# Patient Record
Sex: FEMALE | Race: WHITE
[De-identification: names, ages, dates, MRNs, and addresses within clinical notes are randomized per-mention and may not be internally consistent; named-entity substitution may affect disease eponyms.]

---

## 2017-02-10 ENCOUNTER — HOSPITAL ENCOUNTER (OUTPATIENT)
Dept: HOSPITAL 74 - FER | Age: 62
Setting detail: OBSERVATION
LOS: 2 days | Discharge: HOME | End: 2017-02-12
Attending: NURSE PRACTITIONER | Admitting: INTERNAL MEDICINE
Payer: COMMERCIAL

## 2017-02-10 VITALS — BODY MASS INDEX: 35.1 KG/M2

## 2017-02-10 DIAGNOSIS — L03.211: Primary | ICD-10-CM

## 2017-02-10 DIAGNOSIS — K04.7: ICD-10-CM

## 2017-02-10 DIAGNOSIS — F41.9: ICD-10-CM

## 2017-02-10 DIAGNOSIS — E83.42: ICD-10-CM

## 2017-02-10 DIAGNOSIS — I10: ICD-10-CM

## 2017-02-10 LAB
ALBUMIN SERPL-MCNC: 4.5 G/DL (ref 3.5–5)
ALP SERPL-CCNC: 76 U/L (ref 32–92)
ALT SERPL-CCNC: 15 U/L (ref 10–40)
ANION GAP SERPL CALC-SCNC: 11 MMOL/L (ref 8–16)
AST SERPL-CCNC: 14 U/L (ref 10–42)
BILIRUB SERPL-MCNC: 1 MG/DL (ref 0.2–1)
CALCIUM SERPL-MCNC: 9.5 MG/DL (ref 8.4–10.2)
CO2 SERPL-SCNC: 27 MMOL/L (ref 22–28)
CREAT SERPL-MCNC: 0.8 MG/DL (ref 0.6–1.3)
DEPRECATED RDW RBC AUTO: 13.1 % (ref 11.6–15.6)
GLUCOSE SERPL-MCNC: 97 MG/DL (ref 74–106)
MCH RBC QN AUTO: 27.9 PG (ref 25.7–33.7)
MCHC RBC AUTO-ENTMCNC: 33 G/DL (ref 32–36)
MCV RBC: 84.7 FL (ref 80–96)
PLATELET # BLD AUTO: 136 K/MM3 (ref 134–434)
PMV BLD: 8.5 FL (ref 7.5–11.1)
PROT SERPL-MCNC: 7.1 G/DL (ref 6.4–8.3)
WBC # BLD AUTO: 7.2 K/MM3 (ref 4–10)

## 2017-02-10 PROCEDURE — G0378 HOSPITAL OBSERVATION PER HR: HCPCS

## 2017-02-10 RX ADMIN — CLINDAMYCIN PHOSPHATE SCH MLS/HR: 600 INJECTION, SOLUTION INTRAVENOUS at 20:09

## 2017-02-10 RX ADMIN — SODIUM CHLORIDE SCH MLS/HR: 9 INJECTION, SOLUTION INTRAVENOUS at 19:25

## 2017-02-10 NOTE — PDOC
History of Present Illness





- General


Chief Complaint: Toothache


Stated Complaint: INFECTED LEFT UPPER TOOTH #14


Time Seen by Provider: 02/10/17 13:36





- History of Present Illness


Initial Comments: 





02/10/17 13:52





61-year-old female with a past medical history of hypertension and anxiety 

patient complained of left facial swelling, and left upper molar dental pain 

since Wednesday


She noted some facial redness today


She denies any fevers or chills


She went to the dentist office, who emergently pulled her left upper molar, and 

then told her to come to the emergency department immediately from the dental 

office for IV antibiotics


The patient denies any swollen lymph nodes, or stiff neck


She denies any cough or sputum


She denies any other complaints at this time, and the remainder of the review 

of systems is negative





Past History





- Past Medical History


Allergies/Adverse Reactions: 


 Allergies











Allergy/AdvReac Type Severity Reaction Status Date / Time


 


No Known Allergies Allergy   Verified 02/10/17 13:23











Home Medications: 


Ambulatory Orders





Lisinopril [Prinivil -] 10 mg PO DAILY 03/06/15 


Quetiapine Fumarate [Seroquel -] 200 mg PO HS 03/06/15 








Anemia: No


Asthma: No


Cancer: Yes (LEFT ABDOMEN-S/P CHEMO & SURGERY)


Cardiac Disorders: No


CVA: No


COPD: No


CHF: No


Dementia: No


Diabetes: No


GI Disorders: Yes (GASTRIC ULCER-1995)


 Disorders: No


HTN: Yes (DX 2011)


Hypercholesterolemia: Yes (DX 2011)


Liver Disease: No


Psychiatric Problems: Yes (ANXIETY/DEPRESSION/ AUD HULLICNATIONS)


Seizures: No


Thyroid Disease: No





- Surgical History


Abdominal Surgery: Yes (ABDOMINAL MASS REMOVED-2012)


Appendectomy: No


Cardiac Surgery: No


Cholecystectomy: Yes (1995-CELESTE COLEMANEY)


Lung Surgery: No


Neurologic Surgery: No


Orthopedic Surgery: No





- Psycho/Social/Smoking Cessation Hx


Anxiety: No


Suicidal Ideation: No


Smoking History: Former smoker


Have you smoked in the past 12 months: No


Number of Cigarettes Smoked Daily: 25


If you are a former smoker, when did you quit?: 26 YRS AGO


Information on smoking cessation initiated: No


Hx Alcohol Use: No


Drug/Substance Use Hx: No


Substance Use Type: None


Hx Substance Use Treatment: No





*Physical Exam





- Vital Signs


 Last Vital Signs











Temp Pulse Resp BP Pulse Ox


 


 99.0 F   102 H  18   188/119   98 


 


 02/10/17 13:22  02/10/17 13:22  02/10/17 13:22  02/10/17 13:22  02/10/17 13:22














- Physical Exam


Comments: 


02/10/17 13:53


Physical exam





 Last Vital Signs











Temp Pulse Resp BP Pulse Ox


 


 99.0 F   102 H  18   188/119   98 


 


 02/10/17 13:22  02/10/17 13:22  02/10/17 13:22  02/10/17 13:22  02/10/17 13:22














Patient is alert and ambulatory


Head is normocephalic and atraumatic


There is left sided facial swelling, with an area of facial cellulitis


There is no area of fluctuance felt


Patient is able to open and close her mouth,


The dental site where the left upper molar was pulled appears clean, but there 

is surrounding gum swelling


There is no purulent drainage from the site


There is no submental swelling or tenderness


There is no Jorge L's angina


There is no swelling or tenderness in the angle of the jaw


There is no meningismus


There is no cervical adenopathy


Lungs are clear


Heart is regular











ED Treatment Course





- LABORATORY


CBC & Chemistry Diagram: 


 02/11/17 07:30





 02/10/17 13:45





Medical Decision Making





- Medical Decision Making





02/10/17 13:55








Dental infection, with some evidence of facial cellulitis


Will start with IV Toradol and clindamycin


We'll check labwork and blood cultures





SIGN OUT 


Case discussed in detail with oncoming Emergency Physician including history, 

physical exam and ancillary studies. 


Oncoming Emergency Physician has assumed care for the patient and will complete 

the evaluation and treatment. 


trnsfer of care to Dr. Blevins at 2 PM





*DC/Admit/Observation/Transfer


Diagnosis at time of Disposition: 


 Dental infection, Facial cellulitis





- Discharge Dispostion


Condition at time of disposition: Stable

## 2017-02-10 NOTE — HP
CHIEF COMPLAINT: Left facial swelling 





PCP: Dr. Benson 


Oncologist: Dr. Ha [phone]





HISTORY OF PRESENT ILLNESS:


This is a 61 year old female with pmhx of HTN, anxiety, ? lymphoma with abd 

tumor removal s/p chemotherapy, former cigarette smoker (for 30 years, quit 9 

years ago) with a past medical history of abdominal tumor removal s/p 

chemotherapy for 2 years, ~ 4 year ago at [facility] presented to the ED 

after being sent in from her dentist for left facial swelling and redness. 


Today, she had a left tooth (#14) extracted due to an abscess. She currently 

states her pain is under control, she has no difficultly breathing or swallowing

, denies fever, chills, CP, abd pain, nausea, vomiting. She is still bleeding 

from her tooth extraction site, changing oral gauze pads, however bleeding is 

not excessive. Son is at bedside, says she was seen in June at [facility] for her 

lymphoma check up and everything was fine, they were not sure exactly what was 

removed in her stomach and what lymphoma she had. 





ER course was notable for:


(1) Left sided facial swelling and erythema, no abscess noted, no fluctuance 


(2) IVF started 


(3) Clindamycin 600mg x1 





Recent Travel:  





PAST MEDICAL HISTORY: 


HTN, anxiety, ? lymphoma, abdominal tumor 





PAST SURGICAL HISTORY:


R flank tumor removed 





Social History: Lives in [place] 


Smoking: none


Alcohol: none 


Drugs: none 





Family History: NA


Allergies


No Known Allergies Allergy (Verified 02/10/17 13:23)


 








HOME MEDICATIONS:


 Home Medications











 Medication  Instructions  Recorded


 


Furosemide [Lasix -] 20 mg PO DAILY 03/06/15


 


Lisinopril [Prinivil -] 10 mg PO DAILY 03/06/15


 


Quetiapine Fumarate [Seroquel -] 200 mg PO HS 03/06/15








REVIEW OF SYSTEMS


CONSTITUTIONAL: 


Absent:  fever, chills, diaphoresis, generalized weakness, malaise, loss of 

appetite, weight change


HEENT: 


Tooth abscess Absent:  rhinorrhea, nasal congestion, throat pain, throat 

swelling, difficulty swallowing, mouth swelling, ear pain, eye pain, visual 

changes


CARDIOVASCULAR: 


Absent: chest pain, syncope, palpitations, irregular heart rate, lightheadedness

, peripheral edema


RESPIRATORY: 


Absent: cough, shortness of breath, dyspnea with exertion, orthopnea, wheezing, 

stridor, hemoptysis


GASTROINTESTINAL:


Absent: abdominal pain, abdominal distension, nausea, vomiting, diarrhea, 

constipation, melena, hematochezia


GENITOURINARY: 


Absent: dysuria, frequency, urgency, hesitancy, hematuria, flank pain, genital 

pain


MUSCULOSKELETAL: 


Absent: myalgia, arthralgia, joint swelling, back pain, neck pain


SKIN: 


Left cheek swelling, redness Absent: rash, itching, pallor


HEMATOLOGIC/IMMUNOLOGIC: 


Absent: easy bleeding, easy bruising, lymphadenopathy, frequent infections


ENDOCRINE:


Absent: unexplained weight gain, unexplained weight loss, heat intolerance, 

cold intolerance


NEUROLOGIC: 


Absent: headache, focal weakness or paresthesias, dizziness, unsteady gait, 

seizure, mental status changes, bladder or bowel incontinence


PSYCHIATRIC: 


Absent: anxiety, depression, suicidal or homicidal ideation, hallucinations.








 Vital Signs - 24 hr











  02/10/17





  13:22


 


Temperature 99.0 F


 


Pulse Rate 102 H


 


Respiratory 18





Rate 


 


Blood Pressure 188/119


 


O2 Sat by Pulse 98





Oximetry (%) 











PHYSICAL EXAMINATION


GENERAL: Awake, alert, and fully oriented, in no acute distress.


HEAD: Left facial swelling infraorbital to cheek, no abscess, no fluctuance, + 

erythema, no cervical lymph involvement  Normal with no signs of trauma.


EYES: Pupils equal, round and reactive to light, extraocular movements intact, 

sclera anicteric, conjunctiva clear. No lid lag.


EARS, NOSE, THROAT: L tooth bleeding Ears normal, nares patent, oropharynx 

clear without exudates. Moist mucous membranes.


NECK: Normal range of motion, supple without lymphadenopathy, JVD, or masses.


LUNGS: Breath sounds equal, clear to auscultation bilaterally. No wheezes, and 

no crackles. No accessory muscle use.


HEART: Regular rate and rhythm, normal S1 and S2 without murmur, rub or gallop.


ABDOMEN: Soft, nontender, not distended, normoactive bowel sounds, no guarding, 

no rebound, no masses.  No hepatomegaly or  splenomegaly. 


MUSCULOSKELETAL: Normal range of motion at all joints. No bony deformities or 

tenderness. No CVA tenderness.


UPPER EXTREMITIES: 2+ pulses, warm, well-perfused. No cyanosis. No clubbing. 

Cap refill <2 seconds. No peripheral edema.


LOWER EXTREMITIES: 2+ pulses, warm, well-perfused. No calf tenderness. No 

peripheral edema. 


NEUROLOGICAL:  Cranial nerves II-XII intact. Normal speech. Normal gait.


PSYCHIATRIC: Cooperative. Good eye contact. Appropriate mood and affect.


SKIN: Warm, dry, normal turgor, no rashes or lesions noted. 





 Laboratory Results - last 24 hr











  02/10/17 02/10/17





  13:45 13:45


 


WBC  7.2 


 


RBC  5.22 H 


 


Hgb  14.6 


 


Hct  44.2 


 


MCV  84.7 


 


MCHC  33.0 


 


RDW  13.1 


 


Plt Count  136 


 


MPV  8.5 


 


Sodium   136


 


Potassium   3.8


 


Chloride   98


 


Carbon Dioxide   27


 


Anion Gap   11


 


BUN   14


 


Creatinine   0.8


 


Creat Clearance w eGFR   > 60


 


Random Glucose   97


 


Calcium   9.5


 


Total Bilirubin   1.0


 


AST   14


 


ALT   15


 


Alkaline Phosphatase   76


 


Total Protein   7.1


 


Albumin   4.5











Assessment: 61 year old female with HTN admitted for facial cellulitis 

following left tooth extraction for tooth abscess





Plan:





1. Left facial cellulitis s/p left tooth abscess with extraction 


- Continue clindamycin 


- Continue IVF 


- If worsens will obtain imaging and plastics consult 





2. HTN 


- Continue Lisinopril 


- Continue lasix 





3. Anxiety 


- Continue seroquel 





4. Left tooth extraction 


- Not on any abx per Dentist, will need to go home on augmentin or clinda 

pending resolution of facial cellulitis 





5. DVT ppx 


- SCDs, expect short stay, hold chemical AC while active bleeding from tooth 





Problem List





- Problem


(1) Dental infection


Code(s): K04.7 - PERIAPICAL ABSCESS WITHOUT SINUS





(2) Facial cellulitis


Code(s): L03.211 - CELLULITIS OF FACE





(3) HTN (hypertension)


Code(s): I10 - ESSENTIAL (PRIMARY) HYPERTENSION   





(4) Anxiety


Code(s): F41.9 - ANXIETY DISORDER, UNSPECIFIED








Visit type





- Emergency Visit


Emergency Visit: Yes


Care time: The patient presented to the Emergency Department on the above date 

and was hospitalized for further evaluation of their emergent condition.





- New Patient


This patient is new to me today: Yes


Date on this admission: 02/10/17





- Critical Care


Critical Care patient: No

## 2017-02-10 NOTE — PDOC
ED Treatment Course





- LABORATORY


CBC & Chemistry Diagram: 


 02/10/17 13:45





 02/10/17 13:45





- ADDITIONAL ORDERS


Additional order review: 


 Laboratory  Results











  02/10/17





  13:45


 


Sodium  136


 


Potassium  3.8


 


Chloride  98


 


Carbon Dioxide  27


 


Anion Gap  11


 


BUN  14


 


Creatinine  0.8


 


Creat Clearance w eGFR  > 60


 


Random Glucose  97


 


Calcium  9.5


 


Total Bilirubin  1.0


 


AST  14


 


ALT  15


 


Alkaline Phosphatase  76


 


Total Protein  7.1


 


Albumin  4.5








 











  02/10/17





  13:45


 


RBC  5.22 H


 


MCV  84.7


 


MCHC  33.0


 


RDW  13.1


 


MPV  8.5














- Medications


Given in the ED: 


ED Medications














Discontinued Medications














Generic Name Dose Route Start Last Admin





  Trade Name Freq  PRN Reason Stop Dose Admin


 


Clindamycin Phosphate  50 mls @ 100 mls/hr 02/10/17 13:51 02/10/17 14:00





  Cleocin 600 Mg Premix Ivpb -  IVPB 02/10/17 14:20  100 mls/hr





  ONCE ONE   Administration


 


Ketorolac Tromethamine  30 mg 02/10/17 13:51 02/10/17 13:54





  Toradol Injection -  IVPUSH 02/10/17 13:52  30 mg





  ONCE ONE   Administration














*DC/Admit/Observation/Transfer


Diagnosis at time of Disposition: 


 Dental infection, Facial cellulitis





- Discharge Dispostion


Condition at time of disposition: Stable


Admit: Yes


Decision to Admit order Date/Time: 


Decision to Admit Order











 Category Date Time Status


 


 Decision to Admit to Hospital Routine Admission  02/10/17 15:21 Active














- Referrals


Referrals: 


Julian Davey [Primary Care Provider] - 





- Patient Instructions





- Post Discharge Activity

## 2017-02-10 NOTE — PDOC
*Physical Exam





- Vital Signs


 Last Vital Signs











Temp Pulse Resp BP Pulse Ox


 


 99.0 F   102 H  18   188/119   98 


 


 02/10/17 13:22  02/10/17 13:22  02/10/17 13:22  02/10/17 13:22  02/10/17 13:22














ED Treatment Course





- LABORATORY


CBC & Chemistry Diagram: 


 02/10/17 13:45





 02/10/17 13:45





- ADDITIONAL ORDERS


Additional order review: 


 Laboratory  Results











  02/10/17





  13:45


 


Sodium  136


 


Potassium  3.8


 


Chloride  98


 


Carbon Dioxide  27


 


Anion Gap  11


 


BUN  14


 


Creatinine  0.8


 


Creat Clearance w eGFR  > 60


 


Random Glucose  97


 


Calcium  9.5


 


Total Bilirubin  1.0


 


AST  14


 


ALT  15


 


Alkaline Phosphatase  76


 


Total Protein  7.1


 


Albumin  4.5








 











  02/10/17





  13:45


 


RBC  5.22 H


 


MCV  84.7


 


MCHC  33.0


 


RDW  13.1


 


MPV  8.5














- Medications


Given in the ED: 


ED Medications














Discontinued Medications














Generic Name Dose Route Start Last Admin





  Trade Name Freq  PRN Reason Stop Dose Admin


 


Clindamycin Phosphate  50 mls @ 100 mls/hr 02/10/17 13:51 02/10/17 14:00





  Cleocin 600 Mg Premix Ivpb -  IVPB 02/10/17 14:20  100 mls/hr





  ONCE ONE   Administration


 


Ketorolac Tromethamine  30 mg 02/10/17 13:51 02/10/17 13:54





  Toradol Injection -  IVPUSH 02/10/17 13:52  30 mg





  ONCE ONE   Administration














Medical Decision Making





- Medical Decision Making





02/10/17 15:16


Patient is a 7-year-old woman with history of hypertension and anxiety.  She 

presents with left facial redness and swelling from the maxilla down to the jaw 

line.  She saw the dentist today and had an extraction of an abscessed tooth, #

14.  She was sent to the ED by the dentist for a course of IV antibiotics.  

Clinically, she states her pain is better post extraction.  The left face is 

markedly swollen but there is no fluctuance or abscess.  No involvement of the 

floor of the mouth or the neck.





Impression: Abscess to status post extraction


Left facial cellulitis secondary to tooth infection, spreading from the site of 

the initial infection


No abscess, nontoxic





Plan: Admit to observation for IV antibiotics


Dr. Sim to aware





*DC/Admit/Observation/Transfer


Diagnosis at time of Disposition: 


 Infection of tooth, Cellulitis of face





- Discharge Dispostion


Disposition: HOME


Condition at time of disposition: Good


Admit: No


Decision to Admit order Date/Time: 





02/10/17 15:18


Patient endorsed to Dr. Surinder Sim for admission.

## 2017-02-11 LAB
BASOPHILS # BLD: 0.5 % (ref 0–2)
DEPRECATED RDW RBC AUTO: 12.6 % (ref 11.6–15.6)
EOSINOPHIL # BLD: 1.9 % (ref 0–4.5)
MCH RBC QN AUTO: 28.1 PG (ref 25.7–33.7)
MCHC RBC AUTO-ENTMCNC: 33 G/DL (ref 32–36)
MCV RBC: 85.2 FL (ref 80–96)
NEUTROPHILS # BLD: 66.1 % (ref 42.8–82.8)
PLATELET # BLD AUTO: 96 K/MM3 (ref 134–434)
PMV BLD: 8.2 FL (ref 7.5–11.1)
WBC # BLD AUTO: 3.9 K/MM3 (ref 4–10)

## 2017-02-11 RX ADMIN — CLINDAMYCIN PHOSPHATE SCH MLS/HR: 600 INJECTION, SOLUTION INTRAVENOUS at 03:17

## 2017-02-11 RX ADMIN — CLINDAMYCIN PHOSPHATE SCH MLS/HR: 600 INJECTION, SOLUTION INTRAVENOUS at 21:30

## 2017-02-11 RX ADMIN — LISINOPRIL SCH MG: 10 TABLET ORAL at 10:09

## 2017-02-11 RX ADMIN — SODIUM CHLORIDE SCH MLS/HR: 9 INJECTION, SOLUTION INTRAVENOUS at 19:31

## 2017-02-11 RX ADMIN — CLINDAMYCIN PHOSPHATE SCH MLS/HR: 600 INJECTION, SOLUTION INTRAVENOUS at 15:19

## 2017-02-11 RX ADMIN — FUROSEMIDE SCH MG: 20 TABLET ORAL at 10:09

## 2017-02-11 RX ADMIN — CEFTRIAXONE SODIUM SCH MLS/HR: 2 INJECTION, POWDER, FOR SOLUTION INTRAMUSCULAR; INTRAVENOUS at 10:09

## 2017-02-11 RX ADMIN — FUROSEMIDE SCH: 20 TABLET ORAL at 19:31

## 2017-02-11 RX ADMIN — CLINDAMYCIN PHOSPHATE SCH MLS/HR: 600 INJECTION, SOLUTION INTRAVENOUS at 08:23

## 2017-02-11 NOTE — PN
Progress Note (short form)





- Note


Progress Note: 


ID Consult dictated


L facial cellulitis


S/P dental extraction





Empiric clindamycin/ ceftriaxone x 24h


D/C home 24h on clindamycin 300mg po tid x 7d

## 2017-02-11 NOTE — CONS
DATE OF CONSULTATION:

 

DATE OF DICTATION:  02/11/2017

 

INFECTIOUS DISEASE CONSULTATION

 

HISTORY OF PRESENT ILLNESS:  A 61-year-old female evaluated for facial cellulitis. 

The patient had a dental abscess.  She underwent extraction of a left upper molar on

the day of admission.  She subsequently developed facial erythema and swelling.  She

was referred by the dentist to the emergency room for IV antibiotic therapy.

 

The patient was empirically treated with clindamycin.  She reports some improvement

in the left facial swelling and erythema.  She has some complaints of dental pain. 

She denies any associated fever or chills.

 

PAST MEDICAL HISTORY:  Positive for hypertension, peptic ulcer disease,

hyperlipidemia, lymphoma status post chemotherapy.

 

PAST SURGICAL HISTORY:  Status post laparoscopic cholecystectomy.

 

ALLERGIES:  No known allergies.

 

MEDICATIONS:  Lasix, Prinivil, Seroquel.

 

SOCIAL HISTORY:  Former smoker.

 

LABORATORY DATA:  White count 3.9, hematocrit 36.4, platelets 96, creatinine 0.8. 

Blood cultures pending.

 

PHYSICAL EXAMINATION:

General:  She is awake and alert.  She is not acutely toxic appearing.

Vital Signs:  Temperature 98.9, blood pressure 121/61, pulse 89 and regular,

respirations 19 per minute.

HEENT:  Sclerae anicteric.  On examination of the face, there is erythema and

swelling involving the left mandibular area.  It is warm to touch and slightly

tender.  No crepitus or fluctuance.  Positive submandibular adenopathy.

Neck:  Supple.

Heart:  S1, S2 heard.

Lungs:  Clear.  No stridor or wheezing.

Abdomen:  Soft.  No tenderness elicited.

Extremities:  Negative for edema.

 

IMPRESSION:

1.  Left facial cellulitis.

2.  Status post extraction of infected left upper molar.

 

PLAN:  Await blood culture results.  Continue empiric coverage of oral ron with

clindamycin and ceftriaxone.  Substitute oral antibiotic therapy with clindamycin 300

mg t.i.d.  Next 24 hours with outpatient dental followup.

 

Thank you for the kind referral.

 

 

NATALIE BENTON M.D.

 

[id number]

DD: 02/11/2017 10:23

DT: 02/11/2017 12:58

Job #:  [id number]

## 2017-02-11 NOTE — PN
Physical Exam: 


SUBJECTIVE: Patient seen and examined, denies CP/SOB, denies fever or chills.








OBJECTIVE: 61 year old female with HTN admitted for facial cellulitis following 

left tooth extraction for tooth abscess





 Last Vital Signs











Temp Pulse Resp BP Pulse Ox


 


 98.9 F   81   16   116/63   94 L


 


 02/11/17 14:31  02/11/17 14:31  02/11/17 14:31  02/11/17 14:31  02/11/17 14:31











GENERAL: Awake, alert, and fully oriented, in no acute distress.


HEAD: Left facial swelling infraorbital to cheek, no abscess, no induration, + 

erythema, no cervical lymph involvement  Normal with no signs of trauma.


EYES: Pupils equal, round and reactive to light, extraocular movements intact, 

sclera anicteric, conjunctiva clear. No lid lag.


EARS, NOSE, THROAT: Left tooth bleeding Ears normal, nares patent, oropharynx 

clear without exudates. Moist mucous membranes.


NECK: Normal range of motion, supple without lymphadenopathy, JVD, or masses.


LUNGS: Breath sounds equal, clear to auscultation bilaterally. No wheezes, and 

no crackles. No accessory muscle use.


HEART: Regular rate and rhythm, normal S1 and S2 without murmur, rub or gallop.


ABDOMEN: Soft, nontender, not distended, normoactive bowel sounds, no guarding, 

no rebound, no masses.  No hepatomegaly or  splenomegaly. 


MUSCULOSKELETAL: Normal range of motion at all joints. No bony deformities or 

tenderness. No CVA tenderness.


UPPER EXTREMITIES: 2+ pulses, warm, well-perfused. No cyanosis. No clubbing. 

Cap refill <2 seconds. No peripheral edema.


LOWER EXTREMITIES: 2+ pulses, warm, well-perfused. No calf tenderness. No 

peripheral edema. 


NEUROLOGICAL:  Cranial nerves II-XII intact. Normal speech. Normal gait.


PSYCHIATRIC: Cooperative. Good eye contact. Appropriate mood and affect.


SKIN: Warm, dry, normal turgor, no rashes or lesions noted. 














 Laboratory Results - last 24 hr











  02/11/17 02/11/17





  07:30 07:30


 


WBC  3.9 L D 


 


RBC  4.27 


 


Hgb  12.0  D 


 


Hct  36.4  D 


 


MCV  85.2 


 


MCHC  33.0 


 


RDW  12.6 


 


Plt Count  96 L D 


 


MPV  8.2 


 


Neutrophils %  66.1 


 


Lymphocytes %  20.0 


 


Monocytes %  11.5 H 


 


Eosinophils %  1.9 


 


Basophils %  0.5 


 


Magnesium   1.7 L





 


Active Medications











Generic Name Dose Route Start Last Admin





  Trade Name Freq  PRN Reason Stop Dose Admin


 


Acetaminophen  650 mg 02/10/17 17:40 02/11/17 08:23





  Tylenol -  PO   650 mg





  Q4H PRN   Administration





  FEVER OR PAIN   


 


Furosemide  20 mg 02/11/17 10:00 02/11/17 10:09





  Lasix -  PO   20 mg





  DAILY MICHELLE   Administration


 


Clindamycin Phosphate  50 mls @ 100 mls/hr 02/10/17 21:00 02/11/17 15:19





  Cleocin 600 Mg Premix Ivpb -  IVPB   100 mls/hr





  Q6H-IV MICHELLE   Administration


 


Sodium Chloride  1,000 mls @ 83 mls/hr 02/10/17 17:45 02/10/17 19:25





  Normal Saline -  IV   83 mls/hr





  ASDIR MICHELLE   Administration


 


Ceftriaxone Sodium 2 gm/  100 mls @ 200 mls/hr 02/11/17 10:00 02/11/17 10:09





  Dextrose  IVPB   200 mls/hr





  DAILY MICHELLE   Administration


 


Lisinopril  10 mg 02/11/17 10:00 02/11/17 10:09





  Prinivil  PO   10 mg





  DAILY MICHELLE   Administration


 


Oxycodone HCl  5 mg 02/10/17 17:40 02/11/17 00:44





  Roxicodone -  PO   5 mg





  Q4H PRN   Administration





  PAIN   


 


Quetiapine Fumarate  200 mg 02/10/17 22:00 02/10/17 23:10





  Seroquel -  PO   200 mg





  HS MICHELLE   Administration








 Microbiology











 02/10/17 13:50 Blood Culture - Preliminary





 Blood - Peripheral Venous    NO GROWTH OBTAINED AFTER 24 HOURS, INCUBATION TO 

CONTINUE





    FOR 4 DAYS.


 


 02/10/17 13:50 Blood Culture - Preliminary





 Blood - Peripheral Venous    NO GROWTH OBTAINED AFTER 24 HOURS, INCUBATION TO 

CONTINUE





    FOR 4 DAYS.














ASSESSMENT/PLAN:


1. Left facial cellulitis s/p left tooth abscess with extraction 


- S/p ID eval with recommendations for: rocephin/clinda x24 hours then 

clindamycin oral 300 mg po TID x7 days at D/c


  -Continue IVF 


- oral surgery f/u at D/c


prn analgesia


prn antipyretic, blood cultures no growth x24 hrs.





2. HTN: controlled 


- Continue Lisinopril 


- Continue lasix 


monitor BP, adjust meds prn





3. Anxiety 


- Continue seroquel 





4. Left tooth extraction 


-Per ID, D/c home on clinda( see ID recommendations) 





5- hypomagnesemia: replenish, repeat level in AM











 DVT ppx 


- SCDs,  hold chemical AC while active bleeding from tooth 





Dispo: still requires inpt care.





Visit type





- Emergency Visit


Emergency Visit: Yes


ED Registration Date: 02/10/17


Care time: The patient presented to the Emergency Department on the above date 

and was hospitalized for further evaluation of their emergent condition.





- New Patient


This patient is new to me today: Yes


Date on this admission: 02/11/17





- Critical Care


Critical Care patient: No





- Discharge Referral


Referred to [facility] Yes

## 2017-02-12 VITALS — TEMPERATURE: 98.7 F | SYSTOLIC BLOOD PRESSURE: 116 MMHG | DIASTOLIC BLOOD PRESSURE: 55 MMHG | HEART RATE: 75 BPM

## 2017-02-12 LAB
ANION GAP SERPL CALC-SCNC: 6 MMOL/L (ref 8–16)
BASOPHILS # BLD: 0.8 % (ref 0–2)
CALCIUM SERPL-MCNC: 8.2 MG/DL (ref 8.5–10.1)
CO2 SERPL-SCNC: 29 MMOL/L (ref 21–32)
CREAT SERPL-MCNC: 0.7 MG/DL (ref 0.55–1.02)
DEPRECATED RDW RBC AUTO: 13.8 % (ref 11.6–15.6)
EOSINOPHIL # BLD: 4.1 % (ref 0–4.5)
GLUCOSE SERPL-MCNC: 82 MG/DL (ref 74–106)
MCH RBC QN AUTO: 29.1 PG (ref 25.7–33.7)
MCHC RBC AUTO-ENTMCNC: 33.8 G/DL (ref 32–36)
MCV RBC: 86.1 FL (ref 80–96)
NEUTROPHILS # BLD: 54.7 % (ref 42.8–82.8)
PLATELET # BLD AUTO: 97 K/MM3 (ref 134–434)
PMV BLD: 8.9 FL (ref 7.5–11.1)
WBC # BLD AUTO: 3.3 K/MM3 (ref 4–10)

## 2017-02-12 RX ADMIN — CEFTRIAXONE SODIUM SCH: 2 INJECTION, POWDER, FOR SOLUTION INTRAMUSCULAR; INTRAVENOUS at 11:29

## 2017-02-12 RX ADMIN — FUROSEMIDE SCH MG: 20 TABLET ORAL at 10:05

## 2017-02-12 RX ADMIN — LISINOPRIL SCH MG: 10 TABLET ORAL at 10:05

## 2017-02-12 RX ADMIN — CLINDAMYCIN PHOSPHATE SCH MLS/HR: 600 INJECTION, SOLUTION INTRAVENOUS at 02:54

## 2017-02-12 RX ADMIN — CLINDAMYCIN PHOSPHATE SCH: 600 INJECTION, SOLUTION INTRAVENOUS at 11:28

## 2017-02-12 NOTE — DS
Physical Exam: 


SUBJECTIVE: Patient seen and examined. Feels better. Swallowing without 

difficulty. 








OBJECTIVE:





 Vital Signs











 Period  Temp  Pulse  Resp  BP Sys/Smiley  Pulse Ox


 


 Last 24 Hr  98.7 F-99.7 F  75-89  16-20  112-121/55-63  94-95








PHYSICAL EXAM





GENERAL: The patient is awake, alert, and fully oriented, in no acute distress.


HEAD: Normal with no signs of trauma.


EYES: PERRL, extraocular movements intact, sclera anicteric, conjunctiva clear. 


ENT: Ears normal, nares patent, oropharynx clear without exudates, moist mucous 

membranes.


NECK: Trachea midline, full range of motion, supple. 


LUNGS: Breath sounds equal, clear to auscultation bilaterally, no wheezes, no 

crackles, no accessory muscle use. 


HEART: Regular rate and rhythm, S1, S2 without murmur, rub or gallop.


ABDOMEN: Soft, nontender, nondistended, normoactive bowel sounds, no guarding, 

no rebound


EXTREMITIES: 2+ pulses, warm, well-perfused, no edema. 


NEUROLOGICAL: Cranial nerves II through XII grossly intact. Normal speech, 

steady gait observed.








 Laboratory Results - last 24 hr











  02/12/17 02/12/17





  06:30 06:30


 


WBC  3.3 L 


 


RBC  4.32 


 


Hgb  12.6 


 


Hct  37.2 


 


MCV  86.1 


 


MCHC  33.8 


 


RDW  13.8 


 


Plt Count  97 L 


 


MPV  8.9 


 


Neutrophils %  54.7 


 


Lymphocytes %  27.5 


 


Monocytes %  12.9 H 


 


Eosinophils %  4.1 


 


Basophils %  0.8 


 


Sodium   140


 


Potassium   4.3


 


Chloride   105


 


Carbon Dioxide   29


 


Anion Gap   6 L


 


BUN   15


 


Creatinine   0.7


 


Random Glucose   82


 


Calcium   8.2 L











HOSPITAL COURSE:





Date of Admission:02/10/17





Date of Discharge: 02/12/17





Patient is a 61 year-old woman with history of hypertension and anxiety.  She 

presented to ED with left facial redness and swelling from the maxilla down to 

the jaw line.  She saw the dentist earlier in the day and had an extraction of 

an abscessed tooth, #14.  She was sent to the ED by the dentist for a course of 

IV antibiotics.  Clinically, she stated her pain was better post extraction.  

The left face was markedly swollen but there is no fluctuance or abscess. No 

involvement of the floor of the mouth or the neck.





Impression: Abscess to status post extraction


Left facial cellulitis secondary to tooth infection, spreading from the site of 

the initial infection


No abscess, nontoxic





Admitted to observation for IV antibiotics





Started on clindamycin IV and ceftriaxone for 24 hours. Discharged to home with 

a prescription for clindamycin PO 300mg TID x 7 days. Follow up with dentist in 

one week.





Minutes to complete discharge: 35





Discharge Summary


Reason For Visit: FACIAL CELLULITIS


Current Active Problems





Anxiety (Acute) 


Dental infection (Acute) 


Facial cellulitis (Acute) 


HTN (hypertension) (Acute) 








Condition: Improved





- Instructions


Diet, Activity, Other Instructions: 


A prescription for clindamycin has been sent to your pharmacy. This is an 

antibiotic. Take this medication as directed and be sure to FINISH all the 

medication.





You should follow up with your dentist in one week. 





Return to the emergency department for any new or worsening symptoms.


Referrals: 


Julian Davey [Primary Care Provider] - 


Disposition: HOME





- Home Medications


Comprehensive Discharge Medication List: 


Ambulatory Orders





Lisinopril [Prinivil] 10 mg PO DAILY 03/06/15 


Quetiapine Fumarate [Seroquel -] 200 mg PO HS 03/06/15 








This patient is new to me today: Yes


Date on this admission: 02/12/17


Emergency Visit: Yes


ED Registration Date: 02/10/17


Care time: The patient presented to the Emergency Department on the above date 

and was hospitalized for further evaluation of their emergent condition.


Critical Care patient: No





- Discharge Referral


Referred to [facility] No

## 2017-07-19 ENCOUNTER — HOSPITAL ENCOUNTER (OUTPATIENT)
Dept: HOSPITAL 74 - FASU-ENDO | Age: 62
Discharge: HOME | End: 2017-07-19
Attending: INTERNAL MEDICINE
Payer: COMMERCIAL

## 2017-07-19 VITALS — HEART RATE: 61 BPM | DIASTOLIC BLOOD PRESSURE: 59 MMHG | SYSTOLIC BLOOD PRESSURE: 127 MMHG

## 2017-07-19 VITALS — TEMPERATURE: 98 F

## 2017-07-19 VITALS — BODY MASS INDEX: 37.1 KG/M2

## 2017-07-19 DIAGNOSIS — R10.13: ICD-10-CM

## 2017-07-19 DIAGNOSIS — K29.50: Primary | ICD-10-CM

## 2017-07-19 PROCEDURE — 0DB68ZX EXCISION OF STOMACH, VIA NATURAL OR ARTIFICIAL OPENING ENDOSCOPIC, DIAGNOSTIC: ICD-10-PCS | Performed by: INTERNAL MEDICINE

## 2017-07-19 PROCEDURE — 0DB98ZX EXCISION OF DUODENUM, VIA NATURAL OR ARTIFICIAL OPENING ENDOSCOPIC, DIAGNOSTIC: ICD-10-PCS | Performed by: INTERNAL MEDICINE

## 2017-07-20 NOTE — PATH
Surgical Pathology Report



Patient Name:  SALO WHALEN

Accession #:  [id number]

[facility]. Rec. #:  [id number]                                                        

   /Age/Gender:  1955 (Age: 62) / F

Account:  [id number]                                                          

             Location: [facility]-ENDOSCOPY

Taken:  2017

Received:  2017

Reported:  2017

Physicians:  Roman Shaw M.D.

  



Specimen(s) Received

A: BX ANTRUM 

B: BX DUODENUM 





Clinical History

GERD

Rule out celiac disease, gastritis







Final Diagnosis

A. ANTRUM, BIOPSY:  

MILD CHRONIC GASTRITIS.

IMMUNOSTAIN IS NEGATIVE FOR H. PYLORI ORGANISMS.



B. DUODENUM, BIOPSY:  

DUODENAL MUCOSA WITH NO PATHOLOGIC FINDINGS.



Note: Features suggestive of celiac disease are not identified in this biopsy.





***Electronically Signed***

Jillian Awad M.D.





Gross Description

A.  Received in formalin, labeled "antrum" are 2 tan, irregular portions of soft

tissue measuring 0.3 and 0.4 cm. in greatest dimension. The specimens are

submitted in toto in one cassette.



B.  Received in formalin, labeled "duodenum" are 2 tan, irregular portions of

soft tissue measuring 0.3 and 0.4 cm. in greatest dimension. The specimens are

submitted in toto in one cassette.

## 2019-06-04 PROBLEM — Z00.00 ENCOUNTER FOR PREVENTIVE HEALTH EXAMINATION: Status: ACTIVE | Noted: 2019-06-04

## 2019-06-05 ENCOUNTER — RESULT REVIEW (OUTPATIENT)
Age: 64
End: 2019-06-05

## 2019-06-06 ENCOUNTER — RESULT REVIEW (OUTPATIENT)
Age: 64
End: 2019-06-06

## 2019-06-07 ENCOUNTER — RESULT REVIEW (OUTPATIENT)
Age: 64
End: 2019-06-07

## 2019-06-07 ENCOUNTER — APPOINTMENT (OUTPATIENT)
Dept: THORACIC SURGERY | Facility: HOSPITAL | Age: 64
End: 2019-06-07
Payer: MEDICARE

## 2019-06-07 PROCEDURE — ZZZZZ: CPT

## 2019-06-10 PROBLEM — Z00.00 ENCOUNTER FOR PREVENTIVE HEALTH EXAMINATION: Noted: 2019-06-10

## 2019-06-19 ENCOUNTER — RESULT REVIEW (OUTPATIENT)
Age: 64
End: 2019-06-19

## 2019-06-19 ENCOUNTER — APPOINTMENT (OUTPATIENT)
Dept: HEMATOLOGY ONCOLOGY | Facility: CLINIC | Age: 64
End: 2019-06-19
Payer: MEDICARE

## 2019-06-19 ENCOUNTER — APPOINTMENT (OUTPATIENT)
Dept: HEMATOLOGY ONCOLOGY | Facility: CLINIC | Age: 64
End: 2019-06-19

## 2019-06-19 VITALS
SYSTOLIC BLOOD PRESSURE: 120 MMHG | OXYGEN SATURATION: 90 % | WEIGHT: 190 LBS | DIASTOLIC BLOOD PRESSURE: 82 MMHG | BODY MASS INDEX: 30.53 KG/M2 | RESPIRATION RATE: 22 BRPM | TEMPERATURE: 98.6 F | HEIGHT: 66 IN | HEART RATE: 103 BPM

## 2019-06-19 DIAGNOSIS — J98.11 ATELECTASIS: ICD-10-CM

## 2019-06-19 DIAGNOSIS — Z87.891 PERSONAL HISTORY OF NICOTINE DEPENDENCE: ICD-10-CM

## 2019-06-19 DIAGNOSIS — Z78.9 OTHER SPECIFIED HEALTH STATUS: ICD-10-CM

## 2019-06-19 PROCEDURE — 99205 OFFICE O/P NEW HI 60 MIN: CPT

## 2019-06-19 RX ORDER — ARIPIPRAZOLE 30 MG/1
30 TABLET ORAL
Refills: 0 | Status: ACTIVE | COMMUNITY

## 2019-06-21 PROBLEM — Z78.9 NO FAMILY HISTORY OF CANCER: Status: ACTIVE | Noted: 2019-06-21

## 2019-06-21 NOTE — ASSESSMENT
[FreeTextEntry1] : 63 yo female, resident of [place] at the [facility], former smoker referred for evaluation of pathological hilar adenopathy.  THe EBUS with FNA was inconclusive.\par Patient was referred for PETCT.\par \par Images and laboratory tests from previous admission were reviewed. \par \par Labs showed hypogammaglobulinemia.

## 2019-06-21 NOTE — HISTORY OF PRESENT ILLNESS
[de-identified] : 64 year old female presents today to rule out Lymphoma.  Patient has a complicated medical and surgical history.  She apparently jumped off a bridge in 2017 and ended up having major trauma requiring tracheotomy, feeding tube, and extensive abdominal surgery.  In January of 2019 show was hospitalized at [place] in which she was found to have partial right lower lobe atelectasis- treated with abx therapy.  She was lost to follow up after being transferred back to skilled nursing facility.  In May 2019 patient was readmitted to [facility] due to hearing voices.  Seen by psych and was found to also have an abnormal chest xray with hypoxemia with a pO2 of 55 on room air.  A CT scan of chest was done which revealed a partial RLL atelectasis with marked hilar fullness and extensive subcarinal and right paratracheal adenopathy.  She also had complaints of a cough over the past few weeks with yellow sputum production as well as loss if 100 pounds over the past year.  Patient is a former smoker 2 packs per day, quit approximately 10-15 years ago.  Patient also is thrombocytopenic with platelets of 90 dated 5/30/2019.  Patient has an EF of 61 percent dated 6/5/19.  She states her brother had cancer, unsure of which type.  She also states she had cancer in the past removed near the kidney states it was stage I.\par \par A. LYMPH NODE, SUBCARINAL, ENDOBRONCHIAL ULTRASOUND-GUIDED FINE NEEDLE ASPIRATION:\par ATYPICAL FINDINGS, see comment.\par \par Cytology smears display a cellular specimen composed of somewhat monotonous population of cells with enlarged nuclei, irregular nuclear contour and coarse chromatin (best seen on pap stained slide). In the background of few small lymphocytes and abundant ciliated bronchial epithelium.\par Cell block reveals mainly blood, non-contributory. Insufficient for further studies.\par \par B. LYMPH NODE, LEVEL 4R, ENDOBRONCHIAL ULTRASOUND-GUIDED FINE NEEDLE ASPIRATION:\par ATYPICAL FINDINGS, see comment.\par \par Cytology smears display a cellular specimen composed of few large cells with enlarged nuclei, focal nuclear molding and marked crushed artifact in the background of a polymorphous population of lymphocytes, plasma cells, tingle body macrophages and ciliated bronchial epithelium.\par Cell blocks (B1/ B2) reveals fragments of lymph node admixed with abundant blood and cartilage.\par \par Immunohistochemical stains: The cells are positive for LCA (CD45) while negative for PANCK, CK7, CK20, Chromogranin, synaptophysin and TTF1. Ki-67 reveals a mild increased in  proliferative index.\par Comment: In summary the cytomorphology reveals few large atypical cells;  a lymphoproliferative disorder cannot be completely excluded. \par Recommend repeat sample for flow cytometric analysis as clinically indicated.\par Case discussed at the daily cytopathology  conference (at [facility]) on 06/10/2019 and 06/14/2019.\par \par

## 2019-06-21 NOTE — PHYSICAL EXAM
[Capable of only limited self care, confined to bed or chair more than 50% of waking hours] : Status 3- Capable of only limited self care, confined to bed or chair more than 50% of waking hours [Normal] : affect appropriate [de-identified] : in wheelchair

## 2019-07-01 ENCOUNTER — APPOINTMENT (OUTPATIENT)
Dept: HEMATOLOGY ONCOLOGY | Facility: CLINIC | Age: 64
End: 2019-07-01

## 2019-07-12 ENCOUNTER — APPOINTMENT (OUTPATIENT)
Dept: THORACIC SURGERY | Facility: CLINIC | Age: 64
End: 2019-07-12
Payer: MEDICARE

## 2019-07-12 VITALS
HEART RATE: 103 BPM | BODY MASS INDEX: 30.53 KG/M2 | SYSTOLIC BLOOD PRESSURE: 102 MMHG | HEIGHT: 66 IN | DIASTOLIC BLOOD PRESSURE: 86 MMHG | RESPIRATION RATE: 19 BRPM | OXYGEN SATURATION: 92 % | WEIGHT: 190 LBS

## 2019-07-12 PROCEDURE — 99213 OFFICE O/P EST LOW 20 MIN: CPT

## 2019-07-15 NOTE — PHYSICAL EXAM
[Sclera] : the sclera and conjunctiva were normal [PERRL With Normal Accommodation] : pupils were equal in size, round, and reactive to light [Extraocular Movements] : extraocular movements were intact [Neck Appearance] : the appearance of the neck was normal [Neck Cervical Mass (___cm)] : no neck mass was observed [Jugular Venous Distention Increased] : there was no jugular-venous distention [Heart Rate And Rhythm] : heart rate was normal and rhythm regular [Heart Sounds] : normal S1 and S2 [Heart Sounds Gallop] : no gallops [Murmurs] : no murmurs [Heart Sounds Pericardial Friction Rub] : no pericardial rub [Examination Of The Chest] : the chest was normal in appearance [Chest Visual Inspection Thoracic Asymmetry] : no chest asymmetry [Diminished Respiratory Excursion] : normal chest expansion [Abdomen Soft] : soft [Bowel Sounds] : normal bowel sounds [Abdomen Tenderness] : non-tender [Cervical Lymph Nodes Enlarged Posterior Bilaterally] : posterior cervical [Abdomen Mass (___ Cm)] : no abdominal mass palpated [Cervical Lymph Nodes Enlarged Anterior Bilaterally] : anterior cervical [Supraclavicular Lymph Nodes Enlarged Bilaterally] : supraclavicular [Axillary Lymph Nodes Enlarged Bilaterally] : axillary [Femoral Lymph Nodes Enlarged Bilaterally] : femoral [Inguinal Lymph Nodes Enlarged Bilaterally] : inguinal [No CVA Tenderness] : no ~M costovertebral angle tenderness [No Spinal Tenderness] : no spinal tenderness [Nail Clubbing] : no clubbing  or cyanosis of the fingernails [Musculoskeletal - Swelling] : no joint swelling seen [Motor Tone] : muscle strength and tone were normal [Skin Color & Pigmentation] : normal skin color and pigmentation [Skin Turgor] : normal skin turgor [] : no rash [Impaired Insight] : insight and judgment were intact [Oriented To Time, Place, And Person] : oriented to person, place, and time [Affect] : the affect was normal [FreeTextEntry1] : sitting in wheelchair.  able to ambulate but weak.  lives in assisted living

## 2019-07-15 NOTE — ASSESSMENT
[FreeTextEntry1] : This is a 65 y/o F with pmhx of schizophrenia (resident of [facility] with chronic trach site) found to have extensive hypermetabolic lymphadenopathy.  Recent EBUS pathology was determined to be inconclusive.  Patient will need to  undergo additional tissue sampling.  She will be scheduled for biopsy of cervical lymph node next week. I will discuss options with Dr. Larose.

## 2019-07-15 NOTE — DATA REVIEWED
[FreeTextEntry1] : PET 7/9/19:  \par 1. multiple nonenlarged, but FDG avid right cervical lymph nodes in levles II, III, and IV (max SUV 5.5).\par 2. multiple FDG avid right supraclavicular lymph nodes (max SUV 8.7) with the largest measuring 2.9 x 1.5 cm\par 3.  multiple non-avid thyroid nodules, including a non-avid 2.1 cm right thyroid nodule\par 4.  multiple FDG avid right perihilar/infrahilar masses (max SUV 10.2) with the largest measuring approx 3.1 x 2.6 cm.  The infrahilar mass is causing stenosis of the RLL bronchus with resultant postobstructive atelectasis.  There is also a small FDG avid pleural effusion (max SUV 2.1. \par 5.  There is extensive right mediastinal lymphadenopathy involving the prevascular, paratracheal and subcarinal regions (max SUV 12.3).  THe largest conglomerate in the right lower paratracheal region measures 3.5 x 2.2cm.  There are multiple nonenlarged but FDG avid left upper paratracheal, prevascular and AP window lymph nodes (max SUV 4.8)\par 6.  THere are multiple FDG avid nonenlarged retroperitoneal lymph nodes in the aortocaval and para-aortic regions (max SUV 2.8).  THere are some FDG avid bilateral common iliac lymph nodes (max SUV 3.1).

## 2019-07-15 NOTE — CONSULT LETTER
[Dear  ___] : Dear  [unfilled], [Consult Letter:] : I had the pleasure of evaluating your patient, [unfilled]. [Please see my note below.] : Please see my note below. [Sincerely,] : Sincerely, [Consult Closing:] : Thank you very much for allowing me to participate in the care of this patient.  If you have any questions, please do not hesitate to contact me. [FreeTextEntry3] : Oswald Hernandez MD\par Thoracic Surgery \par [facility]

## 2019-07-15 NOTE — HISTORY OF PRESENT ILLNESS
[FreeTextEntry1] : This is a 63 y/o F pmhx of schizophrenia (with suicide attempt s/p trach), former smoker (2ppd x 20 years) who presents to our office for follow up.\par \par She was brought to [facility] ED in June 2019 for hypoxic respiratory failure.  Upon admission, workup imaging demonstrated significnat hilar adenopathy and patient underwent EBUS on 6/7/19.  Final pathology determined to be inconclusive.  Upon discharge she continue to follow up with oncology.  PET scan as outpatient demonstrated FDG avid cerviccal and supraclavicular lymph nodes as well as multiple FDG avid perihilar and infrahilar masses and mediastinal lymphadenopathy.  She was referred to our office for  consultation for additional tissue sampling

## 2019-07-16 ENCOUNTER — APPOINTMENT (OUTPATIENT)
Dept: HEMATOLOGY ONCOLOGY | Facility: CLINIC | Age: 64
End: 2019-07-16
Payer: MEDICARE

## 2019-07-16 ENCOUNTER — RESULT REVIEW (OUTPATIENT)
Age: 64
End: 2019-07-16

## 2019-07-16 VITALS
BODY MASS INDEX: 29.73 KG/M2 | OXYGEN SATURATION: 90 % | TEMPERATURE: 99 F | DIASTOLIC BLOOD PRESSURE: 60 MMHG | SYSTOLIC BLOOD PRESSURE: 115 MMHG | HEART RATE: 101 BPM | RESPIRATION RATE: 20 BRPM | WEIGHT: 184.99 LBS | HEIGHT: 65.98 IN

## 2019-07-16 PROCEDURE — 99214 OFFICE O/P EST MOD 30 MIN: CPT

## 2019-07-16 NOTE — REVIEW OF SYSTEMS
[Negative] : Allergic/Immunologic [Fatigue] : fatigue [Joint Stiffness] : joint stiffness [Muscle Weakness] : muscle weakness [FreeTextEntry9] : in W/C

## 2019-07-16 NOTE — PHYSICAL EXAM
[Capable of only limited self care, confined to bed or chair more than 50% of waking hours] : Status 3- Capable of only limited self care, confined to bed or chair more than 50% of waking hours [Normal] : affect appropriate [de-identified] : in wheelchair

## 2019-07-16 NOTE — ASSESSMENT
[FreeTextEntry1] : 65 yo female, resident of [facility], former smoker referred for evaluation of pathological hilar adenopathy.  The EBUS with FNA was inconclusive.\par Patient was diagnosed in 2012 with NHL - family and patient don’t recall subtype, was treated with chemotherapy and was told after 18 months she is cancer free. \par \par She doesn’t recall facility she was treated at.  \par \par PETCT showed \par - nonenlarged active cervical LN\par - FDG active supraclav LN\par - Right perihilar masses largest 3.1 x 2.6 cm, with stenosis of LL\par - mediastinal adenopathy \par -FDG avid LN - retroperitoneal \par - osteomyelitis of sacrum\par \par Results d/w patient and her daughter-in - law\par Patient requires additional biopsy. She is a former smoker and the lung lesion is suspicious for primary lung ca.\par Plan to schedule biopsy after reviewed of images with Dr. Hernandez and Ar\par  \par \par Labs showed hypogammaglobulinemia with IgG 330. SHe might require IVIG especially in view of osteomyelitis.

## 2019-07-16 NOTE — HISTORY OF PRESENT ILLNESS
[de-identified] : 64 year old female presents today to rule out Lymphoma.  Patient has a complicated medical and surgical history.  She apparently jumped off a bridge in 2017 and ended up having major trauma requiring tracheotomy, feeding tube, and extensive abdominal surgery.  In January of 2019 show was hospitalized at [place] in which she was found to have partial right lower lobe atelectasis- treated with abx therapy.  She was lost to follow up after being transferred back to skilled nursing facility.  In May 2019 patient was readmitted to [place] due to hearing voices.  Seen by psych and was found to also have an abnormal chest xray with hypoxemia with a pO2 of 55 on room air.  A CT scan of chest was done which revealed a partial RLL atelectasis with marked hilar fullness and extensive subcarinal and right paratracheal adenopathy.  She also had complaints of a cough over the past few weeks with yellow sputum production as well as loss if 100 pounds over the past year.  Patient is a former smoker 2 packs per day, quit approximately 10-15 years ago.  Patient also is thrombocytopenic with platelets of 90 dated 5/30/2019.  Patient has an EF of 61 percent dated 6/5/19.  She states her brother had cancer, unsure of which type.  She also states she had cancer in the past removed near the kidney states it was stage I.\par \par A. LYMPH NODE, SUBCARINAL, ENDOBRONCHIAL ULTRASOUND-GUIDED FINE NEEDLE ASPIRATION:\par ATYPICAL FINDINGS, see comment.\par \par Cytology smears display a cellular specimen composed of somewhat monotonous population of cells with enlarged nuclei, irregular nuclear contour and coarse chromatin (best seen on pap stained slide). In the background of few small lymphocytes and abundant ciliated bronchial epithelium.\par Cell block reveals mainly blood, non-contributory. Insufficient for further studies.\par \par B. LYMPH NODE, LEVEL 4R, ENDOBRONCHIAL ULTRASOUND-GUIDED FINE NEEDLE ASPIRATION:\par ATYPICAL FINDINGS, see comment.\par \par Cytology smears display a cellular specimen composed of few large cells with enlarged nuclei, focal nuclear molding and marked crushed artifact in the background of a polymorphous population of lymphocytes, plasma cells, tingle body macrophages and ciliated bronchial epithelium.\par Cell blocks (B1/ B2) reveals fragments of lymph node admixed with abundant blood and cartilage.\par \par Immunohistochemical stains: The cells are positive for LCA (CD45) while negative for PANCK, CK7, CK20, Chromogranin, synaptophysin and TTF1. Ki-67 reveals a mild increased in  proliferative index.\par Comment: In summary the cytomorphology reveals few large atypical cells;  a lymphoproliferative disorder cannot be completely excluded. \par Recommend repeat sample for flow cytometric analysis as clinically indicated.\par Case discussed at the daily cytopathology  conference (at [facility]) on 06/10/2019 and 06/14/2019.\par \par  [de-identified] : Patient presents today for followup to rule out malignancy.  PET CT dated 7/9/19-FDG avid right perihilar mass causing post obstruction atelectasis in the RLL.  This finding may represent a primary lung malignancy, however the significant FDG avid lymphadenopathy above and below the diaphragm is suggestive of lymphoma recurrent- further tissue sampling.  Patient states she is feeling ok however very fatigued.

## 2019-07-16 NOTE — CONSULT LETTER
[Dear  ___] : Dear  [unfilled], [Consult Letter:] : I had the pleasure of evaluating your patient, [unfilled]. [Please see my note below.] : Please see my note below. [Consult Closing:] : Thank you very much for allowing me to participate in the care of this patient.  If you have any questions, please do not hesitate to contact me. [Sincerely,] : Sincerely, [DrBryan  ___] : Dr. ARCOS [FreeTextEntry3] : Nova Ty MD\par [facility] [place] [facility]\par

## 2019-08-01 ENCOUNTER — RESULT REVIEW (OUTPATIENT)
Age: 64
End: 2019-08-01

## 2019-08-02 ENCOUNTER — RESULT REVIEW (OUTPATIENT)
Age: 64
End: 2019-08-02

## 2019-08-20 ENCOUNTER — RESULT REVIEW (OUTPATIENT)
Age: 64
End: 2019-08-20

## 2019-08-20 ENCOUNTER — APPOINTMENT (OUTPATIENT)
Dept: HEMATOLOGY ONCOLOGY | Facility: CLINIC | Age: 64
End: 2019-08-20
Payer: MEDICARE

## 2019-08-20 VITALS
OXYGEN SATURATION: 92 % | TEMPERATURE: 98.3 F | HEIGHT: 65.94 IN | RESPIRATION RATE: 28 BRPM | SYSTOLIC BLOOD PRESSURE: 143 MMHG | WEIGHT: 184.99 LBS | HEART RATE: 112 BPM | BODY MASS INDEX: 30.09 KG/M2 | DIASTOLIC BLOOD PRESSURE: 86 MMHG

## 2019-08-20 PROCEDURE — 99215 OFFICE O/P EST HI 40 MIN: CPT

## 2019-08-20 RX ORDER — ONDANSETRON 4 MG/1
4 TABLET, ORALLY DISINTEGRATING ORAL
Qty: 20 | Refills: 0 | Status: ACTIVE | COMMUNITY
Start: 2019-08-20 | End: 1900-01-01

## 2019-08-20 RX ORDER — FERROUS SULFATE 325(65) MG
325 TABLET ORAL
Refills: 0 | Status: ACTIVE | COMMUNITY

## 2019-08-20 RX ORDER — GABAPENTIN 300 MG
300 TABLET ORAL
Refills: 0 | Status: ACTIVE | COMMUNITY

## 2019-08-20 RX ORDER — BUPROPION HYDROCHLORIDE 300 MG/1
300 TABLET, EXTENDED RELEASE ORAL
Refills: 0 | Status: ACTIVE | COMMUNITY

## 2019-08-20 RX ORDER — ALLOPURINOL 300 MG/1
300 TABLET ORAL DAILY
Qty: 30 | Refills: 1 | Status: ACTIVE | COMMUNITY
Start: 2019-08-20 | End: 1900-01-01

## 2019-08-20 NOTE — REVIEW OF SYSTEMS
[Fatigue] : fatigue [Joint Stiffness] : joint stiffness [Muscle Weakness] : muscle weakness [Negative] : Allergic/Immunologic [FreeTextEntry9] : in W/C

## 2019-08-20 NOTE — HISTORY OF PRESENT ILLNESS
[Disease:__________________________] : Disease: [unfilled] [R-FLIPI Score: ___] : R-FLIPI Score: [unfilled] [de-identified] : 3a, stage 4  [de-identified] : She presents for follow up of biopsy. Right supraclavicular lymph node needle biopsy on August 2, 2019 revealed grade 3 follicular lymphoma. She is s/p multiple hospitalization for pneumonia and feels weak.She is also having a lot of secretions. \par \par \par  [de-identified] : 64 year old female presents today to rule out Lymphoma.  Patient has a complicated medical and surgical history.  She apparently jumped off a bridge in 2017 and ended up having major trauma requiring tracheotomy, feeding tube, and extensive abdominal surgery.  In January of 2019 show was hospitalized at [place] in which she was found to have partial right lower lobe atelectasis- treated with abx therapy.  She was lost to follow up after being transferred back to skilled nursing facility.  In May 2019 patient was readmitted to [place] due to hearing voices.  Seen by psych and was found to also have an abnormal chest xray with hypoxemia with a pO2 of 55 on room air.  A CT scan of chest was done which revealed a partial RLL atelectasis with marked hilar fullness and extensive subcarinal and right paratracheal adenopathy.  She also had complaints of a cough over the past few weeks with yellow sputum production as well as loss if 100 pounds over the past year.  Patient is a former smoker 2 packs per day, quit approximately 10-15 years ago.  Patient also is thrombocytopenic with platelets of 90 dated 5/30/2019.  Patient has an EF of 61 percent dated 6/5/19.  She states her brother had cancer, unsure of which type.  She also states she had cancer in the past removed near the kidney states it was stage I.\par \par A. LYMPH NODE, SUBCARINAL, ENDOBRONCHIAL ULTRASOUND-GUIDED FINE NEEDLE ASPIRATION:\par ATYPICAL FINDINGS, see comment.\par \par Cytology smears display a cellular specimen composed of somewhat monotonous population of cells with enlarged nuclei, irregular nuclear contour and coarse chromatin (best seen on pap stained slide). In the background of few small lymphocytes and abundant ciliated bronchial epithelium.\par Cell block reveals mainly blood, non-contributory. Insufficient for further studies.\par \par B. LYMPH NODE, LEVEL 4R, ENDOBRONCHIAL ULTRASOUND-GUIDED FINE NEEDLE ASPIRATION:\par ATYPICAL FINDINGS, see comment.\par \par Cytology smears display a cellular specimen composed of few large cells with enlarged nuclei, focal nuclear molding and marked crushed artifact in the background of a polymorphous population of lymphocytes, plasma cells, tingle body macrophages and ciliated bronchial epithelium.\par Cell blocks (B1/ B2) reveals fragments of lymph node admixed with abundant blood and cartilage.\par \par Immunohistochemical stains: The cells are positive for LCA (CD45) while negative for PANCK, CK7, CK20, Chromogranin, synaptophysin and TTF1. Ki-67 reveals a mild increased in  proliferative index.\par Comment: In summary the cytomorphology reveals few large atypical cells;  a lymphoproliferative disorder cannot be completely excluded. \par Recommend repeat sample for flow cytometric analysis as clinically indicated.\par Case discussed at the daily cytopathology  conference (at [facility]) on 06/10/2019 and 06/14/2019.\par \par

## 2019-08-20 NOTE — CONSULT LETTER
[Dear  ___] : Dear  [unfilled], [Consult Letter:] : I had the pleasure of evaluating your patient, [unfilled]. [Please see my note below.] : Please see my note below. [Consult Closing:] : Thank you very much for allowing me to participate in the care of this patient.  If you have any questions, please do not hesitate to contact me. [Sincerely,] : Sincerely, [DrBryan  ___] : Dr. ARCOS [FreeTextEntry3] : Nova Ty MD\par [facility] [place] [facility]\par

## 2019-08-20 NOTE — PHYSICAL EXAM
[Capable of only limited self care, confined to bed or chair more than 50% of waking hours] : Status 3- Capable of only limited self care, confined to bed or chair more than 50% of waking hours [Normal] : affect appropriate [de-identified] : in wheelchair  [de-identified] : cisco [de-identified] : tracheostomy

## 2019-08-20 NOTE — ASSESSMENT
[FreeTextEntry1] : 65 yo female, [facility], former smoker referred for evaluation of pathological hilar adenopathy.  The EBUS with FNA was inconclusive.\par Patient was diagnosed in 2012 with NHL - DLBCL - s/p R_CHOP\par \par \par PETCT showed \par -  active cervical LN\par - FDG active supraclav LN\par - Right perihilar mass\par - retroperitoneal LN\par - \par - no anemia \par - pathology biopsy of supraclavicular LN  c/w follicular lymphoma 3a\par - start Bendamustine/ Rituximab- cycle 1/6 q 4 weeks\par - side effects and monitoring parameters explained to patient and her son \par - short course of allopurinol  \par \par  \par \par Hypogammaglobulinemia with IgG 330. She might require IVIG especially in view of osteomyelitis.

## 2019-08-27 ENCOUNTER — APPOINTMENT (OUTPATIENT)
Dept: HEMATOLOGY ONCOLOGY | Facility: CLINIC | Age: 64
End: 2019-08-27
Payer: MEDICARE

## 2019-08-27 ENCOUNTER — RESULT REVIEW (OUTPATIENT)
Age: 64
End: 2019-08-27

## 2019-08-27 VITALS
SYSTOLIC BLOOD PRESSURE: 114 MMHG | RESPIRATION RATE: 18 BRPM | HEART RATE: 112 BPM | HEIGHT: 65.94 IN | DIASTOLIC BLOOD PRESSURE: 78 MMHG | WEIGHT: 186 LBS | BODY MASS INDEX: 30.25 KG/M2 | OXYGEN SATURATION: 93 % | TEMPERATURE: 98.6 F

## 2019-08-27 PROCEDURE — 99213 OFFICE O/P EST LOW 20 MIN: CPT

## 2019-08-27 RX ORDER — HALOPERIDOL LACTATE 5 MG/ML
5 INJECTION, SOLUTION INTRAMUSCULAR
Refills: 0 | Status: DISCONTINUED | COMMUNITY
End: 2019-08-27

## 2019-08-27 RX ORDER — ARIPIPRAZOLE 5 MG/1
5 TABLET ORAL
Refills: 0 | Status: ACTIVE | COMMUNITY

## 2019-08-27 NOTE — ASSESSMENT
[FreeTextEntry1] : 65 yo female, [facility] former smoker referred for evaluation of pathological hilar adenopathy.  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  Will reduce Decadron dose.  Antipsychotic medication monitored and adjusted by psych.\par - short course of allopurinol  to prevent TLS.\par - cycle 2 in 3 weeks\par \par Hypogammaglobulinemia with IgG 330. She might require IVIG especially in view of osteomyelitis.\par \par History of present illness, review of systems, physical exam and treatment plan reviewed with .\par  \par Office visit in 3 weeks for cycle 2 or prn for new or worsening symptoms.

## 2019-08-27 NOTE — HISTORY OF PRESENT ILLNESS
[Disease:__________________________] : Disease: [unfilled] [R-FLIPI Score: ___] : R-FLIPI Score: [unfilled] [Therapy: ___] : Therapy: [unfilled] [Cycle: ___] : Cycle: [unfilled] [Day: ___] : Day: [unfilled] [de-identified] : 3a, stage 4  [de-identified] : She presents s/p cycle 1 Bendamustine/Rituximab for grade 3 follicular lymphoma. She is s/p admission to [facility] 8/22-8/23, for auditory hallucinations possibly exacerbated by Decadron (she had them in the past). \par \par  [de-identified] : 64 year old female presents today to rule out Lymphoma.  Patient has a complicated medical and surgical history.  She apparently jumped off a bridge in 2017 and ended up having major trauma requiring tracheotomy, feeding tube, and extensive abdominal surgery.  In January of 2019 show was hospitalized at [facility] in which she was found to have partial right lower lobe atelectasis- treated with abx therapy.  She was lost to follow up after being transferred back to skilled nursing facility.  In May 2019 patient was readmitted to [facility] due to hearing voices.  Seen by psych and was found to also have an abnormal chest xray with hypoxemia with a pO2 of 55 on room air.  A CT scan of chest was done which revealed a partial RLL atelectasis with marked hilar fullness and extensive subcarinal and right paratracheal adenopathy.  She also had complaints of a cough over the past few weeks with yellow sputum production as well as loss if 100 pounds over the past year.  Patient is a former smoker 2 packs per day, quit approximately 10-15 years ago.  Patient also is thrombocytopenic with platelets of 90 dated 5/30/2019.  Patient has an EF of 61 percent dated 6/5/19.  She states her brother had cancer, unsure of which type.  She also states she had cancer in the past removed near the kidney states it was stage I.\par \par A. LYMPH NODE, SUBCARINAL, ENDOBRONCHIAL ULTRASOUND-GUIDED FINE NEEDLE ASPIRATION:\par ATYPICAL FINDINGS, see comment.\par \par Cytology smears display a cellular specimen composed of somewhat monotonous population of cells with enlarged nuclei, irregular nuclear contour and coarse chromatin (best seen on pap stained slide). In the background of few small lymphocytes and abundant ciliated bronchial epithelium.\par Cell block reveals mainly blood, non-contributory. Insufficient for further studies.\par \par B. LYMPH NODE, LEVEL 4R, ENDOBRONCHIAL ULTRASOUND-GUIDED FINE NEEDLE ASPIRATION:\par ATYPICAL FINDINGS, see comment.\par \par Cytology smears display a cellular specimen composed of few large cells with enlarged nuclei, focal nuclear molding and marked crushed artifact in the background of a polymorphous population of lymphocytes, plasma cells, tingle body macrophages and ciliated bronchial epithelium.\par Cell blocks (B1/ B2) reveals fragments of lymph node admixed with abundant blood and cartilage.\par \par Immunohistochemical stains: The cells are positive for LCA (CD45) while negative for PANCK, CK7, CK20, Chromogranin, synaptophysin and TTF1. Ki-67 reveals a mild increased in  proliferative index.\par Comment: In summary the cytomorphology reveals few large atypical cells;  a lymphoproliferative disorder cannot be completely excluded. \par Recommend repeat sample for flow cytometric analysis as clinically indicated.\par Case discussed at the daily cytopathology  conference (at [facility]) on 06/10/2019 and 06/14/2019.\par \par

## 2019-08-27 NOTE — PHYSICAL EXAM
[Capable of only limited self care, confined to bed or chair more than 50% of waking hours] : Status 3- Capable of only limited self care, confined to bed or chair more than 50% of waking hours [Normal] : grossly intact [de-identified] : in wheelchair  [de-identified] : tracheostomy [de-identified] : cisco

## 2019-08-27 NOTE — REVIEW OF SYSTEMS
[Fatigue] : fatigue [Joint Stiffness] : joint stiffness [Negative] : Allergic/Immunologic [Recent Change In Weight] : ~T recent weight change [Lower Ext Edema] : lower extremity edema [Anxiety] : anxiety [Difficulty Walking] : difficulty walking [Depression] : depression [Muscle Weakness] : muscle weakness [Fever] : no fever [Eye Pain] : no eye pain [Vision Problems] : no vision problems [Mucosal Pain] : no mucosal pain [Chest Pain] : no chest pain [Shortness Of Breath] : no shortness of breath [Cough] : no cough [Vomiting] : no vomiting [Constipation] : no constipation [Dysuria] : no dysuria [Diarrhea] : no diarrhea [Skin Rash] : no skin rash [Easy Bleeding] : no tendency for easy bleeding [Easy Bruising] : no tendency for easy bruising [FreeTextEntry2] : + 2lbs [FreeTextEntry9] : in W/C

## 2019-09-17 ENCOUNTER — APPOINTMENT (OUTPATIENT)
Dept: HEMATOLOGY ONCOLOGY | Facility: CLINIC | Age: 64
End: 2019-09-17
Payer: MEDICARE

## 2019-09-17 ENCOUNTER — RESULT REVIEW (OUTPATIENT)
Age: 64
End: 2019-09-17

## 2019-09-17 ENCOUNTER — OTHER (OUTPATIENT)
Age: 64
End: 2019-09-17

## 2019-09-17 VITALS
TEMPERATURE: 97.5 F | BODY MASS INDEX: 31.07 KG/M2 | RESPIRATION RATE: 16 BRPM | HEART RATE: 112 BPM | DIASTOLIC BLOOD PRESSURE: 81 MMHG | HEIGHT: 65.94 IN | OXYGEN SATURATION: 95 % | WEIGHT: 191 LBS | SYSTOLIC BLOOD PRESSURE: 124 MMHG

## 2019-09-17 PROCEDURE — 99213 OFFICE O/P EST LOW 20 MIN: CPT

## 2019-09-17 RX ORDER — NITROFURANTOIN (MONOHYDRATE/MACROCRYSTALS) 25; 75 MG/1; MG/1
100 CAPSULE ORAL
Refills: 0 | Status: DISCONTINUED | COMMUNITY
End: 2019-09-17

## 2019-09-17 NOTE — PHYSICAL EXAM
[Capable of only limited self care, confined to bed or chair more than 50% of waking hours] : Status 3- Capable of only limited self care, confined to bed or chair more than 50% of waking hours [Normal] : affect appropriate [de-identified] : cisco [de-identified] : in wheelchair  [de-identified] : tracheostomy

## 2019-09-17 NOTE — REVIEW OF SYSTEMS
[Fatigue] : fatigue [Recent Change In Weight] : ~T recent weight change [Lower Ext Edema] : lower extremity edema [Joint Stiffness] : joint stiffness [Difficulty Walking] : difficulty walking [Anxiety] : anxiety [Depression] : depression [Muscle Weakness] : muscle weakness [Negative] : Allergic/Immunologic [Abdominal Pain] : abdominal pain [Constipation] : constipation [Fever] : no fever [Eye Pain] : no eye pain [Chest Pain] : no chest pain [Vision Problems] : no vision problems [Mucosal Pain] : no mucosal pain [Cough] : no cough [Shortness Of Breath] : no shortness of breath [Vomiting] : no vomiting [Dysuria] : no dysuria [Skin Rash] : no skin rash [Diarrhea] : no diarrhea [Easy Bleeding] : no tendency for easy bleeding [Easy Bruising] : no tendency for easy bruising [FreeTextEntry2] : + 5 lbs [FreeTextEntry7] : Occasional lateral lower abdominal/ pelvic pain [FreeTextEntry9] : in W/C

## 2019-09-17 NOTE — HISTORY OF PRESENT ILLNESS
[Disease:__________________________] : Disease: [unfilled] [R-FLIPI Score: ___] : R-FLIPI Score: [unfilled] [Therapy: ___] : Therapy: [unfilled] [Cycle: ___] : Cycle: [unfilled] [Day: ___] : Day: [unfilled] [de-identified] : 3a, stage 4  [de-identified] : 64 year old female presents today to rule out Lymphoma.  Patient has a complicated medical and surgical history.  She apparently jumped off a bridge in 2017 and ended up having major trauma requiring tracheotomy, feeding tube, and extensive abdominal surgery.  In January of 2019 show was hospitalized at [place] in which she was found to have partial right lower lobe atelectasis- treated with abx therapy.  She was lost to follow up after being transferred back to skilled nursing facility.  In May 2019 patient was readmitted to [facility] due to hearing voices.  Seen by psych and was found to also have an abnormal chest xray with hypoxemia with a pO2 of 55 on room air.  A CT scan of chest was done which revealed a partial RLL atelectasis with marked hilar fullness and extensive subcarinal and right paratracheal adenopathy.  She also had complaints of a cough over the past few weeks with yellow sputum production as well as loss if 100 pounds over the past year.  Patient is a former smoker 2 packs per day, quit approximately 10-15 years ago.  Patient also is thrombocytopenic with platelets of 90 dated 5/30/2019.  Patient has an EF of 61 percent dated 6/5/19.  She states her brother had cancer, unsure of which type.  She also states she had cancer in the past removed near the kidney states it was stage I.\par \par A. LYMPH NODE, SUBCARINAL, ENDOBRONCHIAL ULTRASOUND-GUIDED FINE NEEDLE ASPIRATION:\par ATYPICAL FINDINGS, see comment.\par \par Cytology smears display a cellular specimen composed of somewhat monotonous population of cells with enlarged nuclei, irregular nuclear contour and coarse chromatin (best seen on pap stained slide). In the background of few small lymphocytes and abundant ciliated bronchial epithelium.\par Cell block reveals mainly blood, non-contributory. Insufficient for further studies.\par \par B. LYMPH NODE, LEVEL 4R, ENDOBRONCHIAL ULTRASOUND-GUIDED FINE NEEDLE ASPIRATION:\par ATYPICAL FINDINGS, see comment.\par \par Cytology smears display a cellular specimen composed of few large cells with enlarged nuclei, focal nuclear molding and marked crushed artifact in the background of a polymorphous population of lymphocytes, plasma cells, tingle body macrophages and ciliated bronchial epithelium.\par Cell blocks (B1/ B2) reveals fragments of lymph node admixed with abundant blood and cartilage.\par \par Immunohistochemical stains: The cells are positive for LCA (CD45) while negative for PANCK, CK7, CK20, Chromogranin, synaptophysin and TTF1. Ki-67 reveals a mild increased in  proliferative index.\par Comment: In summary the cytomorphology reveals few large atypical cells;  a lymphoproliferative disorder cannot be completely excluded. \par Recommend repeat sample for flow cytometric analysis as clinically indicated.\par Case discussed at the daily cytopathology  conference (at [facility]) on 06/10/2019 and 06/14/2019.\par \par  [de-identified] : She presents s/p cycle 2 Bendamustine/Rituximab for grade 3 follicular lymphoma. She is s/p admission to [facility] 8/22-8/23, for auditory hallucinations possibly exacerbated by Decadron (she had them in the past). She has occasional lateral pelvic pain. It may be related to constipation. \par \par

## 2019-09-17 NOTE — ADDENDUM
[FreeTextEntry1] : Pt had local reaction in periph vein to Bendamustine with redness and tenderness.  Discontinued periph vein and midline inserted to complete infusion.

## 2019-09-17 NOTE — ASSESSMENT
[FreeTextEntry1] : 65 yo female, [facility], former smoker referred for evaluation of pathological hilar adenopathy.  The EBUS with FNA was inconclusive.\par Patient was diagnosed in 2012 with NHL - DLBCL - s/p R_CHOP\par \par \par PETCT showed \par -  active cervical LN\par - FDG active supraclav LN\par - Right perihilar mass\par - retroperitoneal LN\par - \par - no anemia \par - pathology biopsy of supraclavicular LN  c/w follicular lymphoma 3a\par - for Bendamustine/ Rituximab cycle 2/6 day 1\par - episode of auditory hallucinations poss connected to Decadron.  Maintain Decadron dose.  Antipsychotic medication monitored and adjusted by psych.\par - short course of allopurinol  to prevent TLS.\par - cycle 2 in 3 weeks\par \par Hypogammaglobulinemia with IgG 330. She might require IVIG especially in view of osteomyelitis.\par \par History of present illness, review of systems, physical exam and treatment plan reviewed with .\par  \par Office visit in 3 weeks for cycle 3 or prn for new or worsening symptoms. \par \par CBC,Chem Profile, Ferritin,Quantitative Immunoglobulins at next visit

## 2019-10-08 ENCOUNTER — RESULT REVIEW (OUTPATIENT)
Age: 64
End: 2019-10-08

## 2019-10-08 ENCOUNTER — APPOINTMENT (OUTPATIENT)
Dept: HEMATOLOGY ONCOLOGY | Facility: CLINIC | Age: 64
End: 2019-10-08
Payer: MEDICARE

## 2019-10-08 VITALS
BODY MASS INDEX: 31.72 KG/M2 | WEIGHT: 195 LBS | RESPIRATION RATE: 18 BRPM | HEIGHT: 65.94 IN | HEART RATE: 117 BPM | SYSTOLIC BLOOD PRESSURE: 139 MMHG | TEMPERATURE: 99.2 F | DIASTOLIC BLOOD PRESSURE: 82 MMHG | OXYGEN SATURATION: 97 %

## 2019-10-08 PROCEDURE — 99213 OFFICE O/P EST LOW 20 MIN: CPT

## 2019-10-08 NOTE — PHYSICAL EXAM
[Capable of only limited self care, confined to bed or chair more than 50% of waking hours] : Status 3- Capable of only limited self care, confined to bed or chair more than 50% of waking hours [Normal] : affect appropriate [de-identified] : in wheelchair  [de-identified] : tracheostomy [de-identified] : cisco

## 2019-10-08 NOTE — REVIEW OF SYSTEMS
[Fatigue] : fatigue [Recent Change In Weight] : ~T recent weight change [Lower Ext Edema] : lower extremity edema [Abdominal Pain] : abdominal pain [Constipation] : constipation [Joint Stiffness] : joint stiffness [Difficulty Walking] : difficulty walking [Anxiety] : anxiety [Depression] : depression [Muscle Weakness] : muscle weakness [Negative] : Allergic/Immunologic [Eye Pain] : no eye pain [Fever] : no fever [Vision Problems] : no vision problems [Mucosal Pain] : no mucosal pain [Shortness Of Breath] : no shortness of breath [Chest Pain] : no chest pain [Cough] : no cough [Vomiting] : no vomiting [Dysuria] : no dysuria [Diarrhea] : no diarrhea [Skin Rash] : no skin rash [Easy Bleeding] : no tendency for easy bleeding [Easy Bruising] : no tendency for easy bruising [FreeTextEntry2] : + 4 lbs [FreeTextEntry7] : Occasional lateral lower abdominal/ pelvic pain [FreeTextEntry9] : in W/C

## 2019-10-08 NOTE — HISTORY OF PRESENT ILLNESS
[R-FLIPI Score: ___] : R-FLIPI Score: [unfilled] [Disease:__________________________] : Disease: [unfilled] [Therapy: ___] : Therapy: [unfilled] [Cycle: ___] : Cycle: [unfilled] [Day: ___] : Day: [unfilled] [de-identified] : 3a, stage 4  [de-identified] : 64 year old female presents today to rule out Lymphoma.  Patient has a complicated medical and surgical history.  She apparently jumped off a bridge in 2017 and ended up having major trauma requiring tracheotomy, feeding tube, and extensive abdominal surgery.  In January of 2019 show was hospitalized at [facility] in which she was found to have partial right lower lobe atelectasis- treated with abx therapy.  She was lost to follow up after being transferred back to skilled nursing facility.  In May 2019 patient was readmitted to [facility] due to hearing voices.  Seen by psych and was found to also have an abnormal chest xray with hypoxemia with a pO2 of 55 on room air.  A CT scan of chest was done which revealed a partial RLL atelectasis with marked hilar fullness and extensive subcarinal and right paratracheal adenopathy.  She also had complaints of a cough over the past few weeks with yellow sputum production as well as loss if 100 pounds over the past year.  Patient is a former smoker 2 packs per day, quit approximately 10-15 years ago.  Patient also is thrombocytopenic with platelets of 90 dated 5/30/2019.  Patient has an EF of 61 percent dated 6/5/19.  She states her brother had cancer, unsure of which type.  She also states she had cancer in the past removed near the kidney states it was stage I.\par \par A. LYMPH NODE, SUBCARINAL, ENDOBRONCHIAL ULTRASOUND-GUIDED FINE NEEDLE ASPIRATION:\par ATYPICAL FINDINGS, see comment.\par \par Cytology smears display a cellular specimen composed of somewhat monotonous population of cells with enlarged nuclei, irregular nuclear contour and coarse chromatin (best seen on pap stained slide). In the background of few small lymphocytes and abundant ciliated bronchial epithelium.\par Cell block reveals mainly blood, non-contributory. Insufficient for further studies.\par \par B. LYMPH NODE, LEVEL 4R, ENDOBRONCHIAL ULTRASOUND-GUIDED FINE NEEDLE ASPIRATION:\par ATYPICAL FINDINGS, see comment.\par \par Cytology smears display a cellular specimen composed of few large cells with enlarged nuclei, focal nuclear molding and marked crushed artifact in the background of a polymorphous population of lymphocytes, plasma cells, tingle body macrophages and ciliated bronchial epithelium.\par Cell blocks (B1/ B2) reveals fragments of lymph node admixed with abundant blood and cartilage.\par \par Immunohistochemical stains: The cells are positive for LCA (CD45) while negative for PANCK, CK7, CK20, Chromogranin, synaptophysin and TTF1. Ki-67 reveals a mild increased in  proliferative index.\par Comment: In summary the cytomorphology reveals few large atypical cells;  a lymphoproliferative disorder cannot be completely excluded. \par Recommend repeat sample for flow cytometric analysis as clinically indicated.\par Case discussed at the daily cytopathology  conference (at [facility]) on 06/10/2019 and 06/14/2019.\par \par  [de-identified] : She presents for cycle 3 Bendamustine/Rituximab for grade 3 follicular lymphoma. She is tolerating therapy well.  Her only complaint is chronic constipation. She denies  auditory hallucinations possibly exacerbated by Decadron (she had them in the past). She denies rash, fever, infections, bleeding, bruising, nausea,vomiting,cough, SOB, chest pain, change in BM, headache or dizziness.

## 2019-10-08 NOTE — ASSESSMENT
[FreeTextEntry1] : 65 yo female, [facility], former smoker referred for evaluation of pathological hilar adenopathy.  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  Maintain Decadron dose.  Antipsychotic medication monitored and adjusted by psych.\par - short course of allopurinol  to prevent TLS\par - bowel regimen for constipation\par Hypogammaglobulinemia with IgG 330. She might require IVIG especially in view of osteomyelitis.\par \par History of present illness, review of systems, physical exam and treatment plan reviewed with .\par  \par Office visit in 4 weeks for cycle 4 or prn for new or worsening symptoms. \par \par CBC,Chem Profile, Ferritin,Quantitative Immunoglobulins at next visit

## 2019-11-05 ENCOUNTER — RESULT REVIEW (OUTPATIENT)
Age: 64
End: 2019-11-05

## 2019-11-05 ENCOUNTER — APPOINTMENT (OUTPATIENT)
Dept: HEMATOLOGY ONCOLOGY | Facility: CLINIC | Age: 64
End: 2019-11-05
Payer: MEDICARE

## 2019-11-05 VITALS
SYSTOLIC BLOOD PRESSURE: 135 MMHG | DIASTOLIC BLOOD PRESSURE: 85 MMHG | TEMPERATURE: 98.8 F | WEIGHT: 193 LBS | BODY MASS INDEX: 31.39 KG/M2 | HEIGHT: 65.94 IN | HEART RATE: 108 BPM | OXYGEN SATURATION: 92 % | RESPIRATION RATE: 16 BRPM

## 2019-11-05 PROCEDURE — 99214 OFFICE O/P EST MOD 30 MIN: CPT

## 2019-11-05 NOTE — HISTORY OF PRESENT ILLNESS
[Disease:__________________________] : Disease: [unfilled] [R-FLIPI Score: ___] : R-FLIPI Score: [unfilled] [Therapy: ___] : Therapy: [unfilled] [Cycle: ___] : Cycle: [unfilled] [Day: ___] : Day: [unfilled] [de-identified] : 3a, stage 4  [de-identified] : [id number] \par Interval History: Current Treatment - On monthly treatment with Bendamustine/Rituximab (8/20/2019-present) - now on cycle 4\par \par Pt is here for cycle 4 treatment, reports of having chronic dizziness that is worsen when she stands up too fast, generalized weakness, mostly on her b/l lower ext. Pt is still able to tolerate IV Decadron well. B/l lower ext edema stays the same.\par \par Pt denies rash, fever, infections, bleeding, bruising, nausea,vomiting,cough, SOB, chest pain, change in BM, headache or dizziness. \par \par Wt - 186-->191-->195-->193 lbs. \par  [de-identified] : \par

## 2019-11-05 NOTE — REASON FOR VISIT
[Follow-Up Visit] : a follow-up [Family Member] : family member [FreeTextEntry2] : grade 3 Follicular Lymphoma

## 2019-11-05 NOTE — REVIEW OF SYSTEMS
[Fever] : no fever [Eye Pain] : no eye pain [Vision Problems] : no vision problems [Mucosal Pain] : no mucosal pain [Chest Pain] : no chest pain [Shortness Of Breath] : no shortness of breath [Cough] : no cough [Vomiting] : no vomiting [Diarrhea] : no diarrhea [Dysuria] : no dysuria [Skin Rash] : no skin rash [Easy Bleeding] : no tendency for easy bleeding [Easy Bruising] : no tendency for easy bruising [FreeTextEntry2] : 10 point review of systems negative except as outlined in HPI [FreeTextEntry7] : Occasional lateral lower abdominal/ pelvic pain [FreeTextEntry9] : in W/C

## 2019-11-05 NOTE — PHYSICAL EXAM
[Capable of only limited self care, confined to bed or chair more than 50% of waking hours] : Status 3- Capable of only limited self care, confined to bed or chair more than 50% of waking hours [Normal] : affect appropriate [de-identified] : in wheelchair  [de-identified] : tracheostomy [de-identified] : cisco [de-identified] : +b/l lower ext edema

## 2019-11-05 NOTE — ASSESSMENT
[FreeTextEntry1] : 63 yo female, [facility], former smoker currently being treated for NHL-DLBCL (dx in 2012) - s/p  R_CHOP\par \par #Follicular Lymphoma - grade 3a\par -7/9/2019 - PETCT showed active cervical LN,  FDG active supraclav LN,  Right perihilar mass,  retroperitoneal LN -pathology biopsy of supraclavicular LN  c/w follicular lymphoma 3a\par -CURRENT TREATMENT -  Bendamustine/ Rituximab - now on cycle 4 - goal is to get 6 treatments and rescan.\par Well tolerated\par - episode of auditory hallucinations poss connected to Decadron.  Maintain Decadron dose.  Antipsychotic medication monitored and adjusted by psych.\par \par #thrombocytopenia - plts trending down - now at 82 - proceed with Bendamustine but would need to go if Plts dropped to 75K. Signs and symptoms of low plts stressed to patient and her aid. \par \par # Ferritin elevated to 1362 (previously 745 and 621) -  ck for HFE gene mutation\par \par - short course of allopurinol  to prevent TLS\par #constipation - controlled with  bowel regimen \par #Hypogammaglobulinemia with IgG 330. She might require IVIG especially in view of osteomyelitis.\par \par History of present illness, review of systems, physical exam and treatment plan reviewed with .\par  \par Office visit in 4 weeks for cycle 4 or prn for new or worsening symptoms. \par \par CBC,Chem Profile, Ferritin,Quantitative Immunoglobulins at next visit

## 2019-11-05 NOTE — HISTORY OF PRESENT ILLNESS
[Disease:__________________________] : Disease: [unfilled] [R-FLIPI Score: ___] : R-FLIPI Score: [unfilled] [Therapy: ___] : Therapy: [unfilled] [Cycle: ___] : Cycle: [unfilled] [Day: ___] : Day: [unfilled] [de-identified] : 3a, stage 4  [de-identified] : [id number] \par Interval History: Current Treatment - On monthly treatment with Bendamustine/Rituximab (8/20/2019-present) - now on cycle 4\par \par Pt is here for cycle 4 treatment, reports of having chronic dizziness that is worsen when she stands up too fast, generalized weakness, mostly on her b/l lower ext. Pt is still able to tolerate IV Decadron well. B/l lower ext edema stays the same.\par \par Pt denies rash, fever, infections, bleeding, bruising, nausea,vomiting,cough, SOB, chest pain, change in BM, headache or dizziness. \par \par Wt - 186-->191-->195-->193 lbs. \par  [de-identified] : \par

## 2019-11-05 NOTE — ASSESSMENT
[FreeTextEntry1] : 63 yo female, [facility], former smoker currently being treated for NHL-DLBCL (dx in 2012) - s/p  R_CHOP\par \par #Follicular Lymphoma - grade 3a\par -7/9/2019 - PETCT showed active cervical LN,  FDG active supraclav LN,  Right perihilar mass,  retroperitoneal LN -pathology biopsy of supraclavicular LN  c/w follicular lymphoma 3a\par -CURRENT TREATMENT -  Bendamustine/ Rituximab - now on cycle 4 - goal is to get 6 treatments and rescan.\par Well tolerated\par - episode of auditory hallucinations poss connected to Decadron.  Maintain Decadron dose.  Antipsychotic medication monitored and adjusted by psych.\par \par #thrombocytopenia - plts trending down - now at 82 - proceed with Bendamustine but would need to go if Plts dropped to 75K. Signs and symptoms of low plts stressed to patient and her aid. \par \par # Ferritin elevated to 1362 (previously 745 and 621) -  ck for HFE gene mutation\par \par - short course of allopurinol  to prevent TLS\par #constipation - controlled with  bowel regimen \par #Hypogammaglobulinemia with IgG 330. She might require IVIG especially in view of osteomyelitis.\par \par History of present illness, review of systems, physical exam and treatment plan reviewed with .\par  \par Office visit in 4 weeks for cycle 4 or prn for new or worsening symptoms. \par \par CBC,Chem Profile, Ferritin,Quantitative Immunoglobulins at next visit

## 2019-11-05 NOTE — ASSESSMENT
[FreeTextEntry1] : 63 yo female, [facility], former smoker currently being treated for NHL-DLBCL (dx in 2012) - s/p  R_CHOP\par \par #Follicular Lymphoma - grade 3a\par -7/9/2019 - PETCT showed active cervical LN,  FDG active supraclav LN,  Right perihilar mass,  retroperitoneal LN -pathology biopsy of supraclavicular LN  c/w follicular lymphoma 3a\par -CURRENT TREATMENT -  Bendamustine/ Rituximab - now on cycle 4 - goal is to get 6 treatments and rescan.\par Well tolerated\par - episode of auditory hallucinations poss connected to Decadron.  Maintain Decadron dose.  Antipsychotic medication monitored and adjusted by psych.\par \par #thrombocytopenia - plts trending down - now at 82 - proceed with Bendamustine but would need to go if Plts dropped to 75K. Signs and symptoms of low plts stressed to patient and her aid. \par \par # Ferritin elevated to 1362 (previously 745 and 621) -  ck for HFE gene mutation\par \par - short course of allopurinol  to prevent TLS\par #constipation - controlled with  bowel regimen \par #Hypogammaglobulinemia with IgG 330. She might require IVIG especially in view of osteomyelitis.\par \par History of present illness, review of systems, physical exam and treatment plan reviewed with .\par  \par Office visit in 4 weeks for cycle 4 or prn for new or worsening symptoms. \par \par CBC,Chem Profile, Ferritin,Quantitative Immunoglobulins at next visit

## 2019-11-05 NOTE — PHYSICAL EXAM
[Capable of only limited self care, confined to bed or chair more than 50% of waking hours] : Status 3- Capable of only limited self care, confined to bed or chair more than 50% of waking hours [Normal] : affect appropriate [de-identified] : in wheelchair  [de-identified] : tracheostomy [de-identified] : cisco [de-identified] : +b/l lower ext edema

## 2019-11-05 NOTE — PHYSICAL EXAM
[Capable of only limited self care, confined to bed or chair more than 50% of waking hours] : Status 3- Capable of only limited self care, confined to bed or chair more than 50% of waking hours [Normal] : affect appropriate [de-identified] : in wheelchair  [de-identified] : tracheostomy [de-identified] : cisco [de-identified] : +b/l lower ext edema

## 2019-11-05 NOTE — ASSESSMENT
[FreeTextEntry1] : 63 yo female, resident [facility], former smoker currently being treated for NHL-DLBCL (dx in 2012) - s/p  R_CHOP\par \par #Follicular Lymphoma - grade 3a\par -7/9/2019 - PETCT showed active cervical LN,  FDG active supraclav LN,  Right perihilar mass,  retroperitoneal LN -pathology biopsy of supraclavicular LN  c/w follicular lymphoma 3a\par -CURRENT TREATMENT -  Bendamustine/ Rituximab - now on cycle 4 - goal is to get 6 treatments and rescan.\par Well tolerated\par - episode of auditory hallucinations poss connected to Decadron.  Maintain Decadron dose.  Antipsychotic medication monitored and adjusted by psych.\par \par #thrombocytopenia - plts trending down - now at 82 - proceed with Bendamustine but would need to go if Plts dropped to 75K. Signs and symptoms of low plts stressed to patient and her aid. \par \par # Ferritin elevated to 1362 (previously 745 and 621) -  ck for HFE gene mutation\par \par - short course of allopurinol  to prevent TLS\par #constipation - controlled with  bowel regimen \par #Hypogammaglobulinemia with IgG 330. She might require IVIG especially in view of osteomyelitis.\par \par History of present illness, review of systems, physical exam and treatment plan reviewed with .\par  \par Office visit in 4 weeks for cycle 4 or prn for new or worsening symptoms. \par \par CBC,Chem Profile, Ferritin,Quantitative Immunoglobulins at next visit

## 2019-11-05 NOTE — PHYSICAL EXAM
[Capable of only limited self care, confined to bed or chair more than 50% of waking hours] : Status 3- Capable of only limited self care, confined to bed or chair more than 50% of waking hours [Normal] : affect appropriate [de-identified] : in wheelchair  [de-identified] : tracheostomy [de-identified] : cisco [de-identified] : +b/l lower ext edema

## 2019-11-05 NOTE — HISTORY OF PRESENT ILLNESS
[Disease:__________________________] : Disease: [unfilled] [R-FLIPI Score: ___] : R-FLIPI Score: [unfilled] [Therapy: ___] : Therapy: [unfilled] [Cycle: ___] : Cycle: [unfilled] [Day: ___] : Day: [unfilled] [de-identified] : 3a, stage 4  [de-identified] : [id number] \par Interval History: Current Treatment - On monthly treatment with Bendamustine/Rituximab (8/20/2019-present) - now on cycle 4\par \par Pt is here for cycle 4 treatment, reports of having chronic dizziness that is worsen when she stands up too fast, generalized weakness, mostly on her b/l lower ext. Pt is still able to tolerate IV Decadron well. B/l lower ext edema stays the same.\par \par Pt denies rash, fever, infections, bleeding, bruising, nausea,vomiting,cough, SOB, chest pain, change in BM, headache or dizziness. \par \par Wt - 186-->191-->195-->193 lbs. \par  [de-identified] : \par

## 2019-12-02 ENCOUNTER — APPOINTMENT (OUTPATIENT)
Dept: HEMATOLOGY ONCOLOGY | Facility: CLINIC | Age: 64
End: 2019-12-02

## 2019-12-04 ENCOUNTER — APPOINTMENT (OUTPATIENT)
Dept: HEMATOLOGY ONCOLOGY | Facility: CLINIC | Age: 64
End: 2019-12-04
Payer: MEDICARE

## 2019-12-04 ENCOUNTER — RESULT REVIEW (OUTPATIENT)
Age: 64
End: 2019-12-04

## 2019-12-04 VITALS
HEART RATE: 122 BPM | OXYGEN SATURATION: 94 % | DIASTOLIC BLOOD PRESSURE: 83 MMHG | HEIGHT: 65.91 IN | BODY MASS INDEX: 31.07 KG/M2 | TEMPERATURE: 98.6 F | WEIGHT: 191 LBS | SYSTOLIC BLOOD PRESSURE: 129 MMHG | RESPIRATION RATE: 20 BRPM

## 2019-12-04 PROCEDURE — 99214 OFFICE O/P EST MOD 30 MIN: CPT

## 2019-12-05 NOTE — REVIEW OF SYSTEMS
[Fever] : no fever [Eye Pain] : no eye pain [Mucosal Pain] : no mucosal pain [Vision Problems] : no vision problems [Chest Pain] : no chest pain [Shortness Of Breath] : no shortness of breath [Cough] : no cough [Vomiting] : no vomiting [Diarrhea] : no diarrhea [Dysuria] : no dysuria [Skin Rash] : no skin rash [Easy Bleeding] : no tendency for easy bleeding [Easy Bruising] : no tendency for easy bruising [FreeTextEntry2] : 10 point review of systems negative except as outlined in HPI [FreeTextEntry9] : in wheelchair

## 2019-12-05 NOTE — PHYSICAL EXAM
[Capable of only limited self care, confined to bed or chair more than 50% of waking hours] : Status 3- Capable of only limited self care, confined to bed or chair more than 50% of waking hours [Normal] : affect appropriate [de-identified] : tracheostomy [de-identified] : in wheelchair  [de-identified] : +trace pitting b/l lower ext edema [de-identified] : cisco

## 2019-12-05 NOTE — ASSESSMENT
[FreeTextEntry1] : 63 yo female, resident of [facility], former smoker currently being treated for NHL-DLBCL (dx in 2012) - s/p  R_CHOP\par \par #Follicular Lymphoma - grade 3a\par -7/9/2019 - PETCT showed active cervical LN,  FDG active supraclav LN,  Right perihilar mass,  retroperitoneal LN -pathology biopsy of supraclavicular LN  c/w follicular lymphoma 3a\par -CURRENT TREATMENT -  Bendamustine/ Rituximab - now on cycle 5 - goal is to get 6 treatments and rescan.\par Well tolerated\par - episode of auditory hallucinations poss connected to Decadron.  Maintain Decadron dose.  Antipsychotic medication monitored and adjusted by psych.\par \par #thrombocytopenia - plts trending down - now at 74 - proceed with Bendamustine-. Signs and symptoms of low plts stressed to patient and her aid. \par \par # Ferritin elevated to 1362 (previously 745 and 621) -  ck for HFE gene mutation next office visit\par \par - short course of allopurinol  to prevent TLS\par #constipation - controlled with  bowel regimen \par #Hypogammaglobulinemia with IgG 330 decreasing more to 273. She might require IVIG especially in view of osteomyelitis.\par \par History of present illness, review of systems, physical exam and treatment plan reviewed with .\par  \par Office visit in 4 weeks for cycle 5 or prn for new or worsening symptoms. \par CBC, CMP, Hemochromatosis gene mutation.

## 2019-12-05 NOTE — HISTORY OF PRESENT ILLNESS
[Disease:__________________________] : Disease: [unfilled] [R-FLIPI Score: ___] : R-FLIPI Score: [unfilled] [Therapy: ___] : Therapy: [unfilled] [Cycle: ___] : Cycle: [unfilled] [Day: ___] : Day: [unfilled] [de-identified] : 3a, stage 4  [de-identified] : \par Interval History: Current Treatment - On monthly treatment with Bendamustine/Rituximab (8/20/2019-present) - now on cycle  5\par \par Pt is here for cycle 5 treatment, reports of unchanged chronic generalized. Pt is receiving physical therapy at the nursing home, able to walk farther and doing more.  Pt has history of atelectasis of right lung and breathing is slightly improve, using O2 supplement only at night. \par \par Pt denies rash, fever, infections, bleeding, bruising, nausea,vomiting,cough, SOB, chest pain, change in BM, headache or dizziness. \par \par Wt - 186-->191-->195-->193 lbs ->191\par  [de-identified] : \par

## 2020-01-02 ENCOUNTER — RESULT REVIEW (OUTPATIENT)
Age: 65
End: 2020-01-02

## 2020-01-02 ENCOUNTER — APPOINTMENT (OUTPATIENT)
Dept: HEMATOLOGY ONCOLOGY | Facility: CLINIC | Age: 65
End: 2020-01-02
Payer: MEDICARE

## 2020-01-02 VITALS
RESPIRATION RATE: 18 BRPM | WEIGHT: 182.98 LBS | DIASTOLIC BLOOD PRESSURE: 79 MMHG | HEIGHT: 65.75 IN | TEMPERATURE: 99.3 F | HEART RATE: 106 BPM | OXYGEN SATURATION: 93 % | SYSTOLIC BLOOD PRESSURE: 121 MMHG | BODY MASS INDEX: 29.76 KG/M2

## 2020-01-02 DIAGNOSIS — C85.90 NON-HODGKIN LYMPHOMA, UNSPECIFIED, UNSPECIFIED SITE: ICD-10-CM

## 2020-01-02 DIAGNOSIS — F20.9 SCHIZOPHRENIA, UNSPECIFIED: ICD-10-CM

## 2020-01-02 PROCEDURE — 99215 OFFICE O/P EST HI 40 MIN: CPT

## 2020-01-02 NOTE — ASSESSMENT
[FreeTextEntry1] : 65 yo female, resident of [facility] at the [facility], former smoker currently being treated for NHL-DLBCL (dx in 2012) - s/p  R_CHOP\par \par #Follicular Lymphoma - grade 3a\par -7/9/2019 - PETCT showed active cervical LN,  FDG active supraclav LN,  Right perihilar mass,  retroperitoneal LN -pathology biopsy of supraclavicular LN  c/w follicular lymphoma 3a\par -CURRENT TREATMENT -  Bendamustine/ Rituximab - now on cycle 6 \par - PETCT after this cycle # 6\par \par #thrombocytopenia - plts 95k- proceed with Bendamustine-. Signs and symptoms of low plts stressed to patient and her aid. \par \par # Ferritin elevated to 1362 (previously 745 and 621) -  ck for HFE gene mutation next office visit\par \par \par #constipation - controlled with  bowel regimen \par #Hypogammaglobulinemia with IgG 330 decreasing more to 273.\par - start IVIG Q 4 weeks\par - patient with pulmonary symptoms \par \par \par \par Office visit in 4 weeks for cycle 5 or prn for new or worsening symptoms. \par CBC, CMP, Hemochromatosis gene mutation.

## 2020-01-02 NOTE — REVIEW OF SYSTEMS
[Fever] : no fever [Mucosal Pain] : no mucosal pain [Eye Pain] : no eye pain [Vision Problems] : no vision problems [Shortness Of Breath] : no shortness of breath [Chest Pain] : no chest pain [Vomiting] : no vomiting [Diarrhea] : no diarrhea [Cough] : no cough [Skin Rash] : no skin rash [Dysuria] : no dysuria [Easy Bleeding] : no tendency for easy bleeding [Easy Bruising] : no tendency for easy bruising [FreeTextEntry9] : in wheelchair [FreeTextEntry2] : 10 point review of systems negative except as outlined in HPI

## 2020-01-02 NOTE — PHYSICAL EXAM
[Capable of only limited self care, confined to bed or chair more than 50% of waking hours] : Status 3- Capable of only limited self care, confined to bed or chair more than 50% of waking hours [Normal] : affect appropriate [de-identified] : in wheelchair  [de-identified] : tracheostomy [de-identified] : +trace pitting b/l lower ext edema [de-identified] : cisco

## 2020-01-02 NOTE — HISTORY OF PRESENT ILLNESS
[Disease:__________________________] : Disease: [unfilled] [R-FLIPI Score: ___] : R-FLIPI Score: [unfilled] [Cycle: ___] : Cycle: [unfilled] [Therapy: ___] : Therapy: [unfilled] [Day: ___] : Day: [unfilled] [de-identified] : 3a, stage 4  [de-identified] : Current Treatment - On monthly treatment with Bendamustine/Rituximab (8/20/2019-present) - now on cycle  5\par \par Patient states she is feeling okay, offers no complaints at this time. Pt denies rash, fever, infections, bleeding, bruising, nausea,vomiting,cough, SOB, chest pain, change in BM, headache or dizziness.\par  [de-identified] : \par

## 2020-01-13 ENCOUNTER — RESULT REVIEW (OUTPATIENT)
Age: 65
End: 2020-01-13

## 2020-01-23 ENCOUNTER — RESULT REVIEW (OUTPATIENT)
Age: 65
End: 2020-01-23

## 2020-01-23 ENCOUNTER — APPOINTMENT (OUTPATIENT)
Dept: HEMATOLOGY ONCOLOGY | Facility: CLINIC | Age: 65
End: 2020-01-23
Payer: MEDICARE

## 2020-01-23 VITALS
BODY MASS INDEX: 29.6 KG/M2 | OXYGEN SATURATION: 94 % | HEIGHT: 65.75 IN | HEART RATE: 108 BPM | SYSTOLIC BLOOD PRESSURE: 137 MMHG | DIASTOLIC BLOOD PRESSURE: 78 MMHG | RESPIRATION RATE: 20 BRPM | WEIGHT: 181.99 LBS | TEMPERATURE: 99.7 F

## 2020-01-23 PROCEDURE — 99214 OFFICE O/P EST MOD 30 MIN: CPT

## 2020-01-23 NOTE — REASON FOR VISIT
[Follow-Up Visit] : a follow-up [Formal Caregiver] : formal caregiver [FreeTextEntry2] : grade 3 Follicular Lymphoma

## 2020-01-23 NOTE — PHYSICAL EXAM
[Capable of only limited self care, confined to bed or chair more than 50% of waking hours] : Status 3- Capable of only limited self care, confined to bed or chair more than 50% of waking hours [Normal] : affect appropriate [de-identified] : in wheelchair  [de-identified] : tracheostomy [de-identified] : cisco [de-identified] : +trace pitting b/l lower ext edema

## 2020-01-23 NOTE — ASSESSMENT
[FreeTextEntry1] : 65 yo female, resident of [facility] the [facility], former smoker currently being treated for NHL-DLBCL (dx in 2012) - s/p  R_CHOP\par \par #Follicular Lymphoma - grade 3a\par -7/9/2019 - PETCT showed active cervical LN,  FDG active supraclav LN,  Right perihilar mass,  retroperitoneal LN -pathology biopsy of supraclavicular LN  c/w follicular lymphoma 3a\par -CURRENT TREATMENT -  Bendamustine/ Rituximab - s/p cycle 6 \par - PETCT reviewed with patient, complete response Deauville 1\par \par # Neutropenia- give Granix 480 today.  Neutropenic precautions issued to pt and her aide.\par \par #thrombocytopenia - plts 66k. Signs and symptoms of low plts stressed to patient and her aid. \par \par # Ferritin elevated to 1362 (previously 745 and 621) -  no  HFE gene mutation continue to monitor\par \par #constipation - controlled with  bowel regimen \par \par #Hypogammaglobulinemia with IgG 330 decreasing more to 273.\par - start IVIG Q 4 weeks at next visit\par - patient with pulmonary symptoms \par \par \par \par Office visit in 1 weeks to check counts and also may proceed with IVIG or prn for new or worsening symptoms. \par \par CBC, CMP, Ferritin, Quant IgG at next visit

## 2020-01-23 NOTE — REVIEW OF SYSTEMS
[Negative] : Allergic/Immunologic [Fever] : no fever [Eye Pain] : no eye pain [Vision Problems] : no vision problems [Mucosal Pain] : no mucosal pain [Chest Pain] : no chest pain [Cough] : no cough [Shortness Of Breath] : no shortness of breath [Diarrhea] : no diarrhea [Vomiting] : no vomiting [Dysuria] : no dysuria [Skin Rash] : no skin rash [Easy Bleeding] : no tendency for easy bleeding [Easy Bruising] : no tendency for easy bruising [FreeTextEntry2] : 10 point review of systems negative except as outlined in HPI [FreeTextEntry9] : in wheelchair

## 2020-01-23 NOTE — HISTORY OF PRESENT ILLNESS
[Disease:__________________________] : Disease: [unfilled] [R-FLIPI Score: ___] : R-FLIPI Score: [unfilled] [Therapy: ___] : Therapy: [unfilled] [Cycle: ___] : Cycle: [unfilled] [de-identified] : 3a, stage 4  [FreeTextEntry1] : Bendamustine/Rituxan x 6 cycles, completed 1/3/20 [de-identified] : Current Treatment - s/p  monthly treatment with Bendamustine/Rituximab (8/20/2019- 1/3/20) x 6 cycles\par \par  She completed chemo on 1/3/20 but is neutropenic today. \par \par She denies current complaint. She denies rash, fever, infections, bleeding, bruising, nausea,vomiting,cough, SOB, chest pain, change in BM, headache or dizziness. [de-identified] : \par

## 2020-02-03 ENCOUNTER — RESULT REVIEW (OUTPATIENT)
Age: 65
End: 2020-02-03

## 2020-02-03 ENCOUNTER — APPOINTMENT (OUTPATIENT)
Dept: HEMATOLOGY ONCOLOGY | Facility: CLINIC | Age: 65
End: 2020-02-03
Payer: MEDICARE

## 2020-02-03 VITALS
SYSTOLIC BLOOD PRESSURE: 122 MMHG | DIASTOLIC BLOOD PRESSURE: 79 MMHG | HEART RATE: 114 BPM | HEIGHT: 65.75 IN | WEIGHT: 181 LBS | TEMPERATURE: 98.7 F | OXYGEN SATURATION: 96 % | BODY MASS INDEX: 29.44 KG/M2 | RESPIRATION RATE: 18 BRPM

## 2020-02-03 DIAGNOSIS — N39.3 STRESS INCONTINENCE (FEMALE) (MALE): ICD-10-CM

## 2020-02-03 DIAGNOSIS — R79.89 OTHER SPECIFIED ABNORMAL FINDINGS OF BLOOD CHEMISTRY: ICD-10-CM

## 2020-02-03 DIAGNOSIS — T45.1X5A AGRANULOCYTOSIS SECONDARY TO CANCER CHEMOTHERAPY: ICD-10-CM

## 2020-02-03 DIAGNOSIS — D70.1 AGRANULOCYTOSIS SECONDARY TO CANCER CHEMOTHERAPY: ICD-10-CM

## 2020-02-03 DIAGNOSIS — D69.6 THROMBOCYTOPENIA, UNSPECIFIED: ICD-10-CM

## 2020-02-03 PROCEDURE — 99214 OFFICE O/P EST MOD 30 MIN: CPT

## 2020-02-03 NOTE — REVIEW OF SYSTEMS
"Speech Language Therapy Clinical Swallow Evaluation completed.    Functional Status: RN reporting patient on clear liquid diet per GI order. Patient AAOx2 with confusion regarding day/month and reason. Oral motor examination was WFL. Patient with minimally hoarse vocal quality and wet cough. Poor dental quality noted. PO trials of 5 ice chips and 4 oz NTL water resulted in no overt s/sx of aspiration. PO trials of 4 oz water resulted in audible gulping, immediate throat clear x1, 2 swallows to clear bolus, and increase in wet vocal quality and slight wheezing. Patient with generalized weakness and required direct assistance with feeding. Patient educated in regards to status, dysphagia, s/sx of aspiration, risk of aspiration and SLP recs. At this time, recommend NTL clear liquid diet with 1:1 feeding. OK for 5 single ice chips with RN an hour.     Recommendations - Diet: 1) NTL clear liquid diet, 2) OK for 5 single ice chips with RN an hour                             Strategies: Strict 1:1 feeding , No Straws, and Head of Bed at 90 Degrees                            Medication Administration: Whole with NTL wash or crush in NTL       Plan of Care: Will benefit from Speech Therapy 3 times per week    See \"Rehab Therapy-Acute\" Patient Summary Report for complete documentation. Thank you for the consult.               " [Negative] : Allergic/Immunologic [Eye Pain] : no eye pain [Fever] : no fever [Vision Problems] : no vision problems [Mucosal Pain] : no mucosal pain [Chest Pain] : no chest pain [Shortness Of Breath] : no shortness of breath [Cough] : no cough [Vomiting] : no vomiting [Diarrhea] : no diarrhea [Dysuria] : no dysuria [Skin Rash] : no skin rash [Easy Bleeding] : no tendency for easy bleeding [FreeTextEntry2] : 10 point review of systems negative except as outlined in HPI [Easy Bruising] : no tendency for easy bruising [FreeTextEntry9] : in wheelchair

## 2020-02-03 NOTE — ASSESSMENT
[FreeTextEntry1] : 65 yo female, resident [facility], former smoker currently being treated for NHL-DLBCL (dx in 2012) - s/p  R_CHOP\par \par #Follicular Lymphoma - grade 3a\par -7/9/2019 - PETCT showed active cervical LN,  FDG active supraclav LN,  Right perihilar mass,  retroperitoneal LN -pathology biopsy of supraclavicular LN  c/w follicular lymphoma 3a\par -CURRENT TREATMENT -  Bendamustine/ Rituximab - s/p cycle 6 \par - PETCT reviewed with patient, complete response Deauville 1\par \par Mild pancytopenia .\par \par Thrombocytopenia - plts 76k. Signs and symptoms of low plts stressed to patient and her aid. \par \par Ferritin elevated to 1129 (prev 1362)-  no  HFE gene mutation continue to monitor\par \par Stress incontinence- given contact info for urogynecologist\par \par Constipation - controlled with  bowel regimen \par \par Hypogammaglobulinemia with IgG 330 decreasing more to 273.\par \par - start IVIG Q 4 weeks at next visit, side effects, toxicities and benefits reviewed. \par \par Office visit in 1 weeks to check counts and also may proceed with IVIG or prn for new or worsening symptoms. \par \par CBC, CMP, Ferritin, Quant IgG at next visit

## 2020-02-03 NOTE — HISTORY OF PRESENT ILLNESS
[Disease:__________________________] : Disease: [unfilled] [R-FLIPI Score: ___] : R-FLIPI Score: [unfilled] [Therapy: ___] : Therapy: [unfilled] [Cycle: ___] : Cycle: [unfilled] [de-identified] : 3a, stage 4  [FreeTextEntry1] : Bendamustine/Rituxan x 6 cycles, completed 1/3/20 [de-identified] : \par  [de-identified] : She completed  monthly treatment with Bendamustine/Rituximab (8/20/2019- 1/3/20) x 6 cycles.  She is following up for neutropenia last week.  She denies fever, infections but is having issues with stress incontinence. She also needs a dental extraction. \par \par . \par She denies current complaint. She denies rash, fever, infections, bleeding, bruising, nausea,vomiting,cough, SOB, chest pain, change in BM, headache or dizziness.

## 2020-02-03 NOTE — PHYSICAL EXAM
[Capable of only limited self care, confined to bed or chair more than 50% of waking hours] : Status 3- Capable of only limited self care, confined to bed or chair more than 50% of waking hours [Normal] : affect appropriate [de-identified] : in wheelchair  [de-identified] : tracheostomy [de-identified] : cisco [de-identified] : +trace pitting b/l lower ext edema

## 2020-02-10 ENCOUNTER — RESULT REVIEW (OUTPATIENT)
Age: 65
End: 2020-02-10

## 2020-02-10 ENCOUNTER — APPOINTMENT (OUTPATIENT)
Dept: HEMATOLOGY ONCOLOGY | Facility: CLINIC | Age: 65
End: 2020-02-10
Payer: MEDICARE

## 2020-02-10 VITALS
WEIGHT: 182.5 LBS | BODY MASS INDEX: 29.68 KG/M2 | TEMPERATURE: 99.1 F | RESPIRATION RATE: 18 BRPM | HEART RATE: 108 BPM | HEIGHT: 65.75 IN | DIASTOLIC BLOOD PRESSURE: 72 MMHG | SYSTOLIC BLOOD PRESSURE: 114 MMHG | OXYGEN SATURATION: 90 %

## 2020-02-10 PROCEDURE — 99214 OFFICE O/P EST MOD 30 MIN: CPT

## 2020-02-10 NOTE — PHYSICAL EXAM
[Capable of only limited self care, confined to bed or chair more than 50% of waking hours] : Status 3- Capable of only limited self care, confined to bed or chair more than 50% of waking hours [Normal] : affect appropriate [de-identified] : in wheelchair  [de-identified] : tracheostomy [de-identified] : cisco [de-identified] : +trace pitting b/l lower ext edema

## 2020-02-10 NOTE — ASSESSMENT
[FreeTextEntry1] : 63 yo female, resident of [facility] at the [facility], former smoker currently being treated for NHL-DLBCL (dx in 2012) - s/p  R_CHOP\par \par #Follicular Lymphoma - grade 3a\par -7/9/2019 - PETCT showed active cervical LN,  FDG active supraclav LN,  Right perihilar mass,  retroperitoneal LN -pathology biopsy of supraclavicular LN  c/w follicular lymphoma 3a\par -CURRENT TREATMENT -  Bendamustine/ Rituximab - s/p cycle 6 \par - PETCT reviewed with patient, complete response Deauville 1\par \par Neutropenia\par - probably related to pt's Clozapine, result to pt's facility, internist, psychiatrist; will give Granix 480, neutropenic precautions.\par \par Thrombocytopenia - plts 92k. Bleeding precautions reviewed with patient and her aide. \par \par Ferritin elevated to 1129 (prev 1362)-  no  HFE gene mutation continue to monitor\par \par Stress incontinence- given contact info for urogynecologist\par \par Constipation - controlled with  bowel regimen \par \par Hypogammaglobulinemia with IgG 330 decreasing more to 273.\par \par - start IVIG Q 3 weeks today. Side effects, benefits and toxicities reviewed with pt and her aide.  \par \par Office visit in 3 weeks to check counts and also may proceed with IVIG or prn for new or worsening symptoms. \par \par CBC, CMP, Ferritin, Quant IgG at next visit

## 2020-02-10 NOTE — HISTORY OF PRESENT ILLNESS
[Disease:__________________________] : Disease: [unfilled] [R-FLIPI Score: ___] : R-FLIPI Score: [unfilled] [Therapy: ___] : Therapy: [unfilled] [Cycle: ___] : Cycle: [unfilled] [de-identified] : 3a, stage 4  [FreeTextEntry1] : Bendamustine/Rituxan x 6 cycles, completed 1/3/20 Hypogam for IVIG q 3 weeks. [de-identified] : She completed  monthly treatment with Bendamustine/Rituximab (8/20/2019- 1/3/20) x 6 cycles.  She is following up IVIG for hypogam and Granix for neutropenia, possibly related to Clozapine. She reports some fatigue.   \par \par . \par She denies current complaint. She denies rash, fever, infections, bleeding, bruising, nausea,vomiting,cough, SOB, chest pain, change in BM, headache or dizziness. [de-identified] : \par

## 2020-02-10 NOTE — REVIEW OF SYSTEMS
[Negative] : Allergic/Immunologic [Fatigue] : fatigue [Fever] : no fever [Eye Pain] : no eye pain [Vision Problems] : no vision problems [Mucosal Pain] : no mucosal pain [Chest Pain] : no chest pain [Shortness Of Breath] : no shortness of breath [Cough] : no cough [Vomiting] : no vomiting [Diarrhea] : no diarrhea [Dysuria] : no dysuria [Skin Rash] : no skin rash [Easy Bleeding] : no tendency for easy bleeding [Easy Bruising] : no tendency for easy bruising [FreeTextEntry9] : in wheelchair

## 2020-02-24 ENCOUNTER — APPOINTMENT (OUTPATIENT)
Dept: HEMATOLOGY ONCOLOGY | Facility: CLINIC | Age: 65
End: 2020-02-24

## 2020-03-02 ENCOUNTER — APPOINTMENT (OUTPATIENT)
Dept: HEMATOLOGY ONCOLOGY | Facility: CLINIC | Age: 65
End: 2020-03-02
Payer: MEDICARE

## 2020-03-02 ENCOUNTER — RESULT REVIEW (OUTPATIENT)
Age: 65
End: 2020-03-02

## 2020-03-02 VITALS
BODY MASS INDEX: 29.11 KG/M2 | OXYGEN SATURATION: 95 % | TEMPERATURE: 98.7 F | SYSTOLIC BLOOD PRESSURE: 135 MMHG | DIASTOLIC BLOOD PRESSURE: 81 MMHG | HEART RATE: 114 BPM | HEIGHT: 65.75 IN | WEIGHT: 179 LBS | RESPIRATION RATE: 20 BRPM

## 2020-03-02 DIAGNOSIS — D70.9 NEUTROPENIA, UNSPECIFIED: ICD-10-CM

## 2020-03-02 DIAGNOSIS — D80.1 NONFAMILIAL HYPOGAMMAGLOBULINEMIA: ICD-10-CM

## 2020-03-02 DIAGNOSIS — C82.38: ICD-10-CM

## 2020-03-02 PROCEDURE — 99214 OFFICE O/P EST MOD 30 MIN: CPT

## 2020-03-02 RX ORDER — ZINC SULFATE 50(220)MG
50 CAPSULE ORAL
Refills: 0 | Status: DISCONTINUED | COMMUNITY
End: 2020-03-02

## 2020-03-02 RX ORDER — ASCORBIC ACID
GRANULES (GRAM) ORAL
Refills: 0 | Status: DISCONTINUED | COMMUNITY
End: 2020-03-02

## 2020-03-02 RX ORDER — OLANZAPINE 20 MG/1
TABLET, FILM COATED ORAL
Refills: 0 | Status: ACTIVE | COMMUNITY

## 2020-03-02 RX ORDER — CLOZAPINE 50 MG/1
50 TABLET ORAL
Refills: 0 | Status: DISCONTINUED | COMMUNITY
End: 2020-03-02

## 2020-03-02 RX ORDER — CLOZAPINE 150 MG/1
150 TABLET, ORALLY DISINTEGRATING ORAL
Refills: 0 | Status: DISCONTINUED | COMMUNITY
End: 2020-03-02

## 2020-03-02 RX ORDER — GABAPENTIN 300 MG/1
300 CAPSULE ORAL
Refills: 0 | Status: DISCONTINUED | COMMUNITY
End: 2020-03-02

## 2020-03-02 RX ORDER — METRONIDAZOLE 500 MG/1
5 TABLET ORAL
Refills: 0 | Status: DISCONTINUED | COMMUNITY
End: 2020-03-02

## 2020-03-02 RX ORDER — IPRATROPIUM BROMIDE AND ALBUTEROL SULFATE 2.5; .5 MG/3ML; MG/3ML
0.5-2.5 (3) SOLUTION RESPIRATORY (INHALATION)
Refills: 0 | Status: ACTIVE | COMMUNITY

## 2020-03-02 NOTE — PHYSICAL EXAM
[Capable of only limited self care, confined to bed or chair more than 50% of waking hours] : Status 3- Capable of only limited self care, confined to bed or chair more than 50% of waking hours [Normal] : affect appropriate [de-identified] : in wheelchair  [de-identified] : tracheostomy [de-identified] : cisco [de-identified] : +trace pitting b/l lower ext edema

## 2020-03-02 NOTE — HISTORY OF PRESENT ILLNESS
[Disease:__________________________] : Disease: [unfilled] [R-FLIPI Score: ___] : R-FLIPI Score: [unfilled] [Therapy: ___] : Therapy: [unfilled] [Cycle: ___] : Cycle: [unfilled] [de-identified] : 3a, stage 4  [FreeTextEntry1] : Bendamustine/Rituxan x 6 cycles, completed 1/3/20 Hypogam for IVIG q 3 weeks. [de-identified] : She completed  monthly treatment with Bendamustine/Rituximab (8/20/2019- 1/3/20) x 6 cycles.  She is following up IVIG for hypogam and Granix for neutropenia, possibly related to Clozapine. Her Clozapine was discontinued since the last visit and she was started on Olanzapine.  She reports some fatigue and chronic cough without fever.    \par \par . \par She denies rash, fever, infections, bleeding, bruising, nausea,vomiting, chest pain, change in BM, headache or dizziness. [de-identified] : \par

## 2020-03-02 NOTE — ASSESSMENT
[FreeTextEntry1] : 63 yo female, resident of [facility] at the [facility], former smoker currently being treated for NHL-DLBCL (dx in 2012) - s/p  R_CHOP\par \par #Follicular Lymphoma - grade 3a\par -7/9/2019 - PETCT showed active cervical LN,  FDG active supraclav LN,  Right perihilar mass,  retroperitoneal LN -pathology biopsy of supraclavicular LN  c/w follicular lymphoma 3a\par -CURRENT TREATMENT -  Bendamustine/ Rituximab - s/p cycle 6 \par - PETCT reviewed with patient, complete response Deauville 1\par \par Neutropenia\par - probably related to pt's Clozapine, result to pt's facility, internist, psychiatrist; will give Granix 480, neutropenic precautions. Clozapine was discontinued and Olanzapine was started.\par \par Thrombocytopenia - plts 92k. Bleeding precautions reviewed with patient and her aide. \par \par Ferritin elevated to 1129 (prev 1362)-  no  HFE gene mutation continue to monitor\par \par Stress incontinence- given contact info for urogynecologist\par \par Constipation - controlled with  bowel regimen \par \par referred to ENT per pt request\par \par Hypogammaglobulinemia continue IVIG 40 gms q 3-4 weeks\par \par  History of present illness, review of systems, physical exam and treatment plan reviewed with . \par \par Office visit in 3-4 weeks or prn for new or worsening symptoms. \par \par

## 2020-03-02 NOTE — REASON FOR VISIT
[Follow-Up Visit] : a follow-up [Lymphoma] : lymphoma [Formal Caregiver] : formal caregiver [FreeTextEntry2] : grade 3 Follicular Lymphoma

## 2020-03-02 NOTE — REVIEW OF SYSTEMS
[Fatigue] : fatigue [Negative] : Allergic/Immunologic [Cough] : cough [Fever] : no fever [Vision Problems] : no vision problems [Eye Pain] : no eye pain [Chest Pain] : no chest pain [Mucosal Pain] : no mucosal pain [Vomiting] : no vomiting [Shortness Of Breath] : no shortness of breath [Dysuria] : no dysuria [Diarrhea] : no diarrhea [Skin Rash] : no skin rash [Easy Bleeding] : no tendency for easy bleeding [Easy Bruising] : no tendency for easy bruising [FreeTextEntry9] : in wheelchair

## 2020-03-09 ENCOUNTER — APPOINTMENT (OUTPATIENT)
Dept: HEMATOLOGY ONCOLOGY | Facility: CLINIC | Age: 65
End: 2020-03-09

## 2020-03-18 NOTE — HISTORY OF PRESENT ILLNESS
[Disease:__________________________] : Disease: [unfilled] [R-FLIPI Score: ___] : R-FLIPI Score: [unfilled] [de-identified] : 3a, stage 4  [Therapy: ___] : Therapy: [unfilled] [Cycle: ___] : Cycle: [unfilled] [FreeTextEntry1] : Bendamustine/Rituxan x 6 cycles, completed 1/3/20 Hypogam for IVIG q 3 weeks. [de-identified] : She completed  monthly treatment with Bendamustine/Rituximab (8/20/2019- 1/3/20) x 6 cycles.  She is following up IVIG for hypogam and Granix for neutropenia, possibly related to Clozapine. Her Clozapine was discontinued since the last visit and she was started on Olanzapine.  She reports some fatigue and chronic cough without fever.    \par \par . \par She denies rash, fever, infections, bleeding, bruising, nausea,vomiting, chest pain, change in BM, headache or dizziness. [de-identified] : \par

## 2020-03-18 NOTE — REASON FOR VISIT
[Lymphoma] : lymphoma [Follow-Up Visit] : a follow-up [Formal Caregiver] : formal caregiver [FreeTextEntry2] : grade 3 Follicular Lymphoma

## 2020-03-18 NOTE — PHYSICAL EXAM
[Capable of only limited self care, confined to bed or chair more than 50% of waking hours] : Status 3- Capable of only limited self care, confined to bed or chair more than 50% of waking hours [Normal] : affect appropriate [de-identified] : in wheelchair  [de-identified] : tracheostomy [de-identified] : cisco [de-identified] : +trace pitting b/l lower ext edema

## 2020-03-18 NOTE — REVIEW OF SYSTEMS
[Fever] : no fever [Fatigue] : fatigue [Eye Pain] : no eye pain [Vision Problems] : no vision problems [Mucosal Pain] : no mucosal pain [Chest Pain] : no chest pain [Shortness Of Breath] : no shortness of breath [Cough] : cough [Vomiting] : no vomiting [Diarrhea] : no diarrhea [Dysuria] : no dysuria [Skin Rash] : no skin rash [Easy Bleeding] : no tendency for easy bleeding [Easy Bruising] : no tendency for easy bruising [Negative] : Allergic/Immunologic [FreeTextEntry9] : in wheelchair

## 2020-03-18 NOTE — ASSESSMENT
[FreeTextEntry1] : 63 yo female, resident of [facility] at the [facility], former smoker currently being treated for NHL-DLBCL (dx in 2012) - s/p  R_CHOP\par \par #Follicular Lymphoma - grade 3a\par -7/9/2019 - PETCT showed active cervical LN,  FDG active supraclav LN,  Right perihilar mass,  retroperitoneal LN -pathology biopsy of supraclavicular LN  c/w follicular lymphoma 3a\par -CURRENT TREATMENT -  Bendamustine/ Rituximab - s/p cycle 6 \par - PETCT reviewed with patient, complete response [place] 1\par \par Neutropenia\par - probably related to pt's Clozapine, result to pt's facility, internist, psychiatrist; will give Granix 480, neutropenic precautions. Clozapine was discontinued and Olanzapine was started.\par \par Thrombocytopenia - plts 92k. Bleeding precautions reviewed with patient and her aide. \par \par Ferritin elevated to 1129 (prev 1362)-  no  HFE gene mutation continue to monitor\par \par Stress incontinence- given contact info for urogynecologist\par \par Constipation - controlled with  bowel regimen \par \par referred to ENT per pt request\par \par Hypogammaglobulinemia continue IVIG 40 gms q 3-4 weeks\par \par  History of present illness, review of systems, physical exam and treatment plan reviewed with . \par \par Office visit in 3-4 weeks or prn for new or worsening symptoms. \par \par

## 2020-03-23 ENCOUNTER — APPOINTMENT (OUTPATIENT)
Dept: HEMATOLOGY ONCOLOGY | Facility: CLINIC | Age: 65
End: 2020-03-23

## 2020-06-22 ENCOUNTER — RESULT REVIEW (OUTPATIENT)
Age: 65
End: 2020-06-22

## 2020-06-22 ENCOUNTER — APPOINTMENT (OUTPATIENT)
Dept: HEMATOLOGY ONCOLOGY | Facility: CLINIC | Age: 65
End: 2020-06-22